# Patient Record
Sex: FEMALE | Race: WHITE | Employment: FULL TIME | URBAN - NONMETROPOLITAN AREA
[De-identification: names, ages, dates, MRNs, and addresses within clinical notes are randomized per-mention and may not be internally consistent; named-entity substitution may affect disease eponyms.]

---

## 2017-03-13 ENCOUNTER — OFFICE VISIT (OUTPATIENT)
Dept: PHYSICAL MEDICINE AND REHAB | Age: 37
End: 2017-03-13

## 2017-03-13 VITALS
SYSTOLIC BLOOD PRESSURE: 132 MMHG | BODY MASS INDEX: 27.46 KG/M2 | DIASTOLIC BLOOD PRESSURE: 90 MMHG | HEART RATE: 76 BPM | HEIGHT: 63 IN | WEIGHT: 155 LBS

## 2017-03-13 DIAGNOSIS — I95.1 SYNCOPE DUE TO ORTHOSTATIC HYPOTENSION: Primary | ICD-10-CM

## 2017-03-13 DIAGNOSIS — R55 SYNCOPE, UNSPECIFIED SYNCOPE TYPE: ICD-10-CM

## 2017-03-13 PROCEDURE — 99213 OFFICE O/P EST LOW 20 MIN: CPT | Performed by: PSYCHIATRY & NEUROLOGY

## 2017-03-13 RX ORDER — LAMOTRIGINE 150 MG/1
TABLET ORAL
Qty: 60 TABLET | Refills: 11 | Status: SHIPPED | OUTPATIENT
Start: 2017-03-13 | End: 2018-03-19 | Stop reason: SDUPTHER

## 2017-03-13 RX ORDER — FOLIC ACID 1 MG/1
1 TABLET ORAL DAILY
Qty: 90 TABLET | Refills: 3 | Status: SHIPPED | OUTPATIENT
Start: 2017-03-13 | End: 2018-03-19 | Stop reason: SDUPTHER

## 2018-01-10 ENCOUNTER — HOSPITAL ENCOUNTER (OUTPATIENT)
Age: 38
Discharge: HOME | End: 2018-01-10
Payer: COMMERCIAL

## 2018-01-10 LAB
ALBUMIN SERPL-MCNC: 4.6 G/DL (ref 3.5–5.1)
ALP BLD-CCNC: 46 U/L (ref 38–126)
ALT SERPL-CCNC: 19 U/L (ref 11–66)
ANION GAP SERPL CALCULATED.3IONS-SCNC: 13 MEQ/L (ref 8–16)
AST SERPL-CCNC: 14 U/L (ref 5–40)
BILIRUB SERPL-MCNC: 0.3 MG/DL (ref 0.3–1.2)
BUN BLDV-MCNC: 13 MG/DL (ref 7–22)
CALCIUM SERPL-MCNC: 9.2 MG/DL (ref 8.5–10.5)
CHLORIDE BLD-SCNC: 103 MEQ/L (ref 98–111)
CO2: 26 MEQ/L (ref 23–33)
CREAT SERPL-MCNC: 1 MG/DL (ref 0.4–1.2)
GFR SERPL CREATININE-BSD FRML MDRD: 62 ML/MIN/1.73M2
GLUCOSE BLD-MCNC: 99 MG/DL (ref 70–108)
HCT VFR BLD CALC: 40.5 % (ref 37–47)
HEMOGLOBIN: 13.6 GM/DL (ref 12–16)
MCH RBC QN AUTO: 29.6 PG (ref 27–31)
MCHC RBC AUTO-ENTMCNC: 33.6 GM/DL (ref 33–37)
MCV RBC AUTO: 87.9 FL (ref 81–99)
PDW BLD-RTO: 13.1 % (ref 11.5–14.5)
PLATELET # BLD: 224 THOU/MM3 (ref 130–400)
PMV BLD AUTO: 10 MCM (ref 7.4–10.4)
POTASSIUM SERPL-SCNC: 5 MEQ/L (ref 3.5–5.2)
RBC # BLD: 4.61 MILL/MM3 (ref 4.2–5.4)
SODIUM BLD-SCNC: 142 MEQ/L (ref 135–145)
T4 FREE: 1.03 NG/DL (ref 0.93–1.76)
TOTAL PROTEIN: 6.7 G/DL (ref 6.1–8)
TSH SERPL DL<=0.05 MIU/L-ACNC: 3.22 UIU/ML (ref 0.4–4.2)
VITAMIN D 25-HYDROXY: 42 NG/ML (ref 30–100)
WBC # BLD: 6.3 THOU/MM3 (ref 4.8–10.8)

## 2018-01-10 PROCEDURE — 85027 COMPLETE CBC AUTOMATED: CPT

## 2018-01-10 PROCEDURE — 36415 COLL VENOUS BLD VENIPUNCTURE: CPT

## 2018-01-10 PROCEDURE — 84439 ASSAY OF FREE THYROXINE: CPT

## 2018-01-10 PROCEDURE — 82306 VITAMIN D 25 HYDROXY: CPT

## 2018-01-10 PROCEDURE — 80053 COMPREHEN METABOLIC PANEL: CPT

## 2018-01-10 PROCEDURE — 84443 ASSAY THYROID STIM HORMONE: CPT

## 2018-03-19 ENCOUNTER — OFFICE VISIT (OUTPATIENT)
Dept: NEUROLOGY | Age: 38
End: 2018-03-19
Payer: COMMERCIAL

## 2018-03-19 VITALS
HEIGHT: 64 IN | WEIGHT: 154.2 LBS | BODY MASS INDEX: 26.32 KG/M2 | HEART RATE: 90 BPM | SYSTOLIC BLOOD PRESSURE: 110 MMHG | DIASTOLIC BLOOD PRESSURE: 82 MMHG

## 2018-03-19 DIAGNOSIS — I95.1 ORTHOSTATIC HYPOTENSION: Primary | ICD-10-CM

## 2018-03-19 DIAGNOSIS — I95.1 SYNCOPE DUE TO ORTHOSTATIC HYPOTENSION: ICD-10-CM

## 2018-03-19 PROCEDURE — 99213 OFFICE O/P EST LOW 20 MIN: CPT | Performed by: PSYCHIATRY & NEUROLOGY

## 2018-03-19 RX ORDER — FOLIC ACID 1 MG/1
1 TABLET ORAL DAILY
Qty: 90 TABLET | Refills: 3 | Status: SHIPPED | OUTPATIENT
Start: 2018-03-19

## 2018-03-19 RX ORDER — LAMOTRIGINE 150 MG/1
TABLET ORAL
Qty: 60 TABLET | Refills: 11 | Status: SHIPPED | OUTPATIENT
Start: 2018-03-19 | End: 2019-03-18 | Stop reason: SDUPTHER

## 2019-03-18 ENCOUNTER — OFFICE VISIT (OUTPATIENT)
Dept: NEUROLOGY | Age: 39
End: 2019-03-18
Payer: COMMERCIAL

## 2019-03-18 VITALS
SYSTOLIC BLOOD PRESSURE: 112 MMHG | HEART RATE: 80 BPM | DIASTOLIC BLOOD PRESSURE: 70 MMHG | HEIGHT: 63 IN | WEIGHT: 151.2 LBS | BODY MASS INDEX: 26.79 KG/M2

## 2019-03-18 DIAGNOSIS — I95.1 SYNCOPE DUE TO ORTHOSTATIC HYPOTENSION: ICD-10-CM

## 2019-03-18 PROCEDURE — 99213 OFFICE O/P EST LOW 20 MIN: CPT | Performed by: NURSE PRACTITIONER

## 2019-03-18 RX ORDER — LAMOTRIGINE 150 MG/1
TABLET ORAL
Qty: 180 TABLET | Refills: 3 | Status: SHIPPED | OUTPATIENT
Start: 2019-03-18 | End: 2020-03-05

## 2019-03-19 ENCOUNTER — HOSPITAL ENCOUNTER (OUTPATIENT)
Age: 39
Discharge: HOME | End: 2019-03-19
Payer: COMMERCIAL

## 2019-03-19 DIAGNOSIS — I95.1 SYNCOPE DUE TO ORTHOSTATIC HYPOTENSION: ICD-10-CM

## 2019-03-19 LAB
ALBUMIN SERPL-MCNC: 4.1 G/DL (ref 3.5–5.1)
ALP BLD-CCNC: 41 U/L (ref 38–126)
ALT SERPL-CCNC: 24 U/L (ref 11–66)
AST SERPL-CCNC: 17 U/L (ref 5–40)
BASOPHILS # BLD: 0.8 %
BASOPHILS ABSOLUTE: 0 THOU/MM3 (ref 0–0.1)
BILIRUB SERPL-MCNC: 0.3 MG/DL (ref 0.3–1.2)
BILIRUBIN DIRECT: < 0.2 MG/DL (ref 0–0.3)
EOSINOPHIL # BLD: 5.4 %
EOSINOPHILS ABSOLUTE: 0.3 THOU/MM3 (ref 0–0.4)
ERYTHROCYTE [DISTWIDTH] IN BLOOD BY AUTOMATED COUNT: 12.7 % (ref 11.5–14.5)
ERYTHROCYTE [DISTWIDTH] IN BLOOD BY AUTOMATED COUNT: 42.1 FL (ref 35–45)
HCT VFR BLD CALC: 39.4 % (ref 37–47)
HEMOGLOBIN: 13 GM/DL (ref 12–16)
IMMATURE GRANS (ABS): 0.01 THOU/MM3 (ref 0–0.07)
IMMATURE GRANULOCYTES: 0.2 %
LYMPHOCYTES # BLD: 38.9 %
LYMPHOCYTES ABSOLUTE: 1.9 THOU/MM3 (ref 1–4.8)
MCH RBC QN AUTO: 29.9 PG (ref 26–33)
MCHC RBC AUTO-ENTMCNC: 33 GM/DL (ref 32.2–35.5)
MCV RBC AUTO: 90.6 FL (ref 81–99)
MONOCYTES # BLD: 5.4 %
MONOCYTES ABSOLUTE: 0.3 THOU/MM3 (ref 0.4–1.3)
NUCLEATED RED BLOOD CELLS: 0 /100 WBC
PLATELET # BLD: 212 THOU/MM3 (ref 130–400)
PMV BLD AUTO: 11 FL (ref 9.4–12.4)
RBC # BLD: 4.35 MILL/MM3 (ref 4.2–5.4)
SEG NEUTROPHILS: 49.3 %
SEGMENTED NEUTROPHILS ABSOLUTE COUNT: 2.5 THOU/MM3 (ref 1.8–7.7)
TOTAL PROTEIN: 6.7 G/DL (ref 6.1–8)
WBC # BLD: 5 THOU/MM3 (ref 4.8–10.8)

## 2019-03-19 PROCEDURE — 36415 COLL VENOUS BLD VENIPUNCTURE: CPT

## 2019-03-19 PROCEDURE — 85025 COMPLETE CBC W/AUTO DIFF WBC: CPT

## 2019-03-19 PROCEDURE — 80076 HEPATIC FUNCTION PANEL: CPT

## 2020-03-05 RX ORDER — LAMOTRIGINE 150 MG/1
TABLET ORAL
Qty: 180 TABLET | Refills: 3 | Status: SHIPPED | OUTPATIENT
Start: 2020-03-05 | End: 2020-04-02

## 2020-03-23 ENCOUNTER — HOSPITAL ENCOUNTER (OUTPATIENT)
Age: 40
Discharge: HOME | End: 2020-03-23
Payer: COMMERCIAL

## 2020-03-23 ENCOUNTER — TELEPHONE (OUTPATIENT)
Dept: NEUROLOGY | Age: 40
End: 2020-03-23

## 2020-03-23 DIAGNOSIS — I95.1 SYNCOPE DUE TO ORTHOSTATIC HYPOTENSION: ICD-10-CM

## 2020-03-23 PROCEDURE — 36415 COLL VENOUS BLD VENIPUNCTURE: CPT

## 2020-03-23 PROCEDURE — 80175 DRUG SCREEN QUAN LAMOTRIGINE: CPT

## 2020-03-23 NOTE — TELEPHONE ENCOUNTER
Patient called stating she had episode of shaking witnessed by coworker on 3/20/20. She bit her tongue and had urine incontinence during episode. She denies missing any doses of Lamictal 150 2 times a day. Spoke with Dr Shauna Oviedo who stated obtain Lamictal level. Consider increasing Lamictal to 200 mg 2 times a day based on results. No driving, swimming, operating heavy machinery, or compromising heights until event free for 6 months.  Patient notified and verbalized understanding

## 2020-03-25 LAB — LAMOTRIGINE LEVEL: 4.4 UG/ML (ref 2.5–15)

## 2020-04-02 ENCOUNTER — VIRTUAL VISIT (OUTPATIENT)
Dept: NEUROLOGY | Age: 40
End: 2020-04-02
Payer: COMMERCIAL

## 2020-04-02 PROCEDURE — 99213 OFFICE O/P EST LOW 20 MIN: CPT | Performed by: NURSE PRACTITIONER

## 2020-04-02 RX ORDER — LAMOTRIGINE 200 MG/1
TABLET ORAL
Qty: 60 TABLET | Refills: 3 | Status: SHIPPED | OUTPATIENT
Start: 2020-04-02 | End: 2020-06-10

## 2020-04-02 ASSESSMENT — ENCOUNTER SYMPTOMS
EYES NEGATIVE: 1
RESPIRATORY NEGATIVE: 1
GASTROINTESTINAL NEGATIVE: 1

## 2020-04-02 NOTE — PROGRESS NOTES
oximetry-     Constitutional: [x] Appears well-developed and well-nourished [x] No apparent distress      [] Abnormal-   Mental status  [x] Alert and awake  [x] Oriented to person/place/time [x]Able to follow commands      Eyes:  EOM    [x]  Normal  [] Abnormal-  Sclera  [x]  Normal  [] Abnormal -         Discharge [x]  None visible  [] Abnormal -    HENT:   [x] Normocephalic, atraumatic. [] Abnormal   [x] Mouth/Throat: Mucous membranes are moist.     External Ears [x] Normal  [] Abnormal-     Neck: [x] No visualized mass     Pulmonary/Chest: [x] Respiratory effort normal.  [x] No visualized signs of difficulty breathing or respiratory distress        [] Abnormal-      Musculoskeletal:   [] Normal gait with no signs of ataxia         [x] Normal range of motion of neck        [] Abnormal-       Neurological:        [x] No Facial Asymmetry (Cranial nerve 7 motor function) (limited exam to video visit)          [x] No gaze palsy        [] Abnormal-         Skin:        [x] No significant exanthematous lesions or discoloration noted on facial skin         [] Abnormal-            Psychiatric:       [x] Normal Affect [x] No Hallucinations        [] Abnormal-     Other pertinent observable physical exam findings-     ASSESSMENT/PLAN:  1. Seizure like activity    She called in on 3-20-20 stating that she had episode of LOC with shaking witnessed by coworker on 3/20/20. Her coworkers told her that she stood up quickly and starred off and then passed out. The last thing she remembers is sitting and talking with her coworkers. She denies any warning or feeling of dizziness prior to passing out. She bit her tongue and had urine incontinence during episode. This lasted 3.5 minutes. She denies missing any doses of Lamictal 150 mg 2 times a day. She has sleep apnea, but she is not wearing her CPAP. She had her Lamictal level drawn on 3/23/2020 =4.4. We will increase her Lamictal to 200 mg twice a day.   We will arrange for her

## 2020-05-18 ENCOUNTER — HOSPITAL ENCOUNTER (OUTPATIENT)
Dept: NON INVASIVE DIAGNOSTICS | Age: 40
Discharge: HOME | End: 2020-05-18
Payer: COMMERCIAL

## 2020-05-18 ENCOUNTER — HOSPITAL ENCOUNTER (OUTPATIENT)
Dept: NEUROLOGY | Age: 40
Discharge: HOME | End: 2020-05-18
Payer: COMMERCIAL

## 2020-05-18 ENCOUNTER — HOSPITAL ENCOUNTER (OUTPATIENT)
Age: 40
Discharge: HOME | End: 2020-05-18
Payer: COMMERCIAL

## 2020-05-18 DIAGNOSIS — R56.9 SEIZURE-LIKE ACTIVITY (HCC): ICD-10-CM

## 2020-05-18 LAB
ALBUMIN SERPL-MCNC: 4.5 G/DL (ref 3.5–5.1)
ALP BLD-CCNC: 47 U/L (ref 38–126)
ALT SERPL-CCNC: 16 U/L (ref 11–66)
AST SERPL-CCNC: 15 U/L (ref 5–40)
BASOPHILS # BLD: 0.8 %
BASOPHILS ABSOLUTE: 0.1 THOU/MM3 (ref 0–0.1)
BILIRUB SERPL-MCNC: 0.2 MG/DL (ref 0.3–1.2)
BILIRUBIN DIRECT: < 0.2 MG/DL (ref 0–0.3)
EOSINOPHIL # BLD: 4.8 %
EOSINOPHILS ABSOLUTE: 0.3 THOU/MM3 (ref 0–0.4)
ERYTHROCYTE [DISTWIDTH] IN BLOOD BY AUTOMATED COUNT: 13.2 % (ref 11.5–14.5)
ERYTHROCYTE [DISTWIDTH] IN BLOOD BY AUTOMATED COUNT: 44.7 FL (ref 35–45)
HCT VFR BLD CALC: 41.6 % (ref 37–47)
HEMOGLOBIN: 13.3 GM/DL (ref 12–16)
IMMATURE GRANS (ABS): 0.02 THOU/MM3 (ref 0–0.07)
IMMATURE GRANULOCYTES: 0.3 %
LYMPHOCYTES # BLD: 34.6 %
LYMPHOCYTES ABSOLUTE: 2.2 THOU/MM3 (ref 1–4.8)
MCH RBC QN AUTO: 29.6 PG (ref 26–33)
MCHC RBC AUTO-ENTMCNC: 32 GM/DL (ref 32.2–35.5)
MCV RBC AUTO: 92.7 FL (ref 81–99)
MONOCYTES # BLD: 5.1 %
MONOCYTES ABSOLUTE: 0.3 THOU/MM3 (ref 0.4–1.3)
NUCLEATED RED BLOOD CELLS: 0 /100 WBC
PLATELET # BLD: 238 THOU/MM3 (ref 130–400)
PMV BLD AUTO: 11.4 FL (ref 9.4–12.4)
RBC # BLD: 4.49 MILL/MM3 (ref 4.2–5.4)
SEG NEUTROPHILS: 54.4 %
SEGMENTED NEUTROPHILS ABSOLUTE COUNT: 3.5 THOU/MM3 (ref 1.8–7.7)
TOTAL PROTEIN: 7.2 G/DL (ref 6.1–8)
WBC # BLD: 6.5 THOU/MM3 (ref 4.8–10.8)

## 2020-05-18 PROCEDURE — 85025 COMPLETE CBC W/AUTO DIFF WBC: CPT

## 2020-05-18 PROCEDURE — 93270 REMOTE 30 DAY ECG REV/REPORT: CPT

## 2020-05-18 PROCEDURE — 95816 EEG AWAKE AND DROWSY: CPT

## 2020-05-18 PROCEDURE — 36415 COLL VENOUS BLD VENIPUNCTURE: CPT

## 2020-05-18 PROCEDURE — 95816 EEG AWAKE AND DROWSY: CPT | Performed by: PSYCHIATRY & NEUROLOGY

## 2020-05-18 PROCEDURE — 80175 DRUG SCREEN QUAN LAMOTRIGINE: CPT

## 2020-05-18 PROCEDURE — 80076 HEPATIC FUNCTION PANEL: CPT

## 2020-05-19 NOTE — PROCEDURES
05/19/2020 6:01:23       T: 05/19/2020 6:06:26     [email or web address]  Job#: [id number]     Doc#: [id number]    CC:

## 2020-05-21 LAB — LAMOTRIGINE LEVEL: 5 UG/ML (ref 2.5–15)

## 2020-06-07 NOTE — PROGRESS NOTES
morning:No.  History of dry mouth in the morning: No.  History of palpitations during night time/nocturnal awakenings: No.  History of sweating during night time/nocturnal awakenings: Yes- occasionally    General:  History of head injury in the past: Yes. [x] Not due to MVA- in 2012 she fell down on the floor and hit her head during a seizure episode. History of seizures: Yes. She is on Lamictal and follows with Dr. Maurilio Calvin MD  Rest less legs syndrome symptoms:NO  History suggestive of periodic limb movements during sleep: NO  History suggestive of hypnagogic hallucinations: NO  History suggestive of hypnopompic hallucinations: NO  History suggestive of sleep talking: NO  History suggestive of sleep walking:NO  History suggestive of bruxism: NO     History suggestive of cataplexy: NO  History suggestive of sleep paralysis:NO    Family history of sleep disorders:  Family history of obstructive sleep apnea: NO.  Family history of Narcolepsy: NO.  Family history of Rest less legs syndrome : NO.      History regarding old sleep studies:  Prior history of sleep study: Yes  Please see the diagnostic data section for details. Using CPAP device: No.  Currently using home Oxygen: NO.       Patient considerations:  Is the patient is ambulatory: Yes  Patient is currently using: None of these Wheelchair, Dene Yaw or Vanna [place]wood. Para/Quadriplegic: NO  Hearing deficit : NO  Claustrophobic: NO  MDD : NO  Blind: NO  Incontinent: NO  Para/Quadraplegi: NO.   Need transportation to and from Sleep Center:NO    Social History:  Social History     Tobacco Use    Smoking status: Never Smoker    Smokeless tobacco: Never Used   Substance Use Topics    Alcohol use: Yes     Alcohol/week: 0.0 standard drinks     Comment: occassionally    Drug use: No   .  She is currently working: Yes. 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   She usually goes to her work at:  8:00AM.   She completes her work vitals reviewed. Constitutional: Patient appears moderately built and moderately nourished. No distress. Patient is oriented to person, place, and time. HENT:   Head: Normocephalic and atraumatic. Right Ear: External ear normal.   Left Ear: External ear normal.   Mouth/Throat: Oropharynx is clear and moist.  No oral thrush. Eyes: Conjunctivae are normal. Pupils are equal, round, and reactive to light. No scleral icterus. Neck: Neck supple. No JVD present. No tracheal deviation present. Cardiovascular: Normal rate, regular rhythm, normal heart sounds. No murmur heard. Pulmonary/Chest: Effort normal and breath sounds normal. No stridor. No respiratory distress. No wheezes. No rales. Patient exhibits no tenderness. Abdominal: Soft. Patient exhibits no distension. No tenderness. Musculoskeletal: Normal range of motion. Extremities: Patient exhibits no edema and no tenderness. Lymphadenopathy:  No cervical adenopathy. Neurological: Patient is alert and oriented to person, place, and time. Skin: Skin is warm and dry. Patient is not diaphoretic. Psychiatric: Patient  has a normal mood and affect. Patient behavior is normal.     Diagnostic Data:      Sleep test: 2014  PATIENT NAME: Franklin Christie       :  1980  MEDICAL RECORD NO. [id number]               ROOM:   ACCOUNT NO: [de-identified]                        DATE: 2014  PHYSICIAN: JACKI Gallardo Ahr:  Dr. Marivel Mensah.     RESPIRATORY EVENT ANALYSIS:  Revealed the patient had a total of 4 apneas and  52 hypopneas. Out of 4 apneas, all of them are found to be obstructive in  nature. The total number of apneas and hypopneas recorded during study were  56 with an apnea-hypopnea index of 13.5. The patient had a total of 87  respiratory events with a respiratory disturbance index of 33.2. The  patient's REM sleep apnea-hypopnea was 0. IMPRESSION:    1.     Mild

## 2020-06-08 ENCOUNTER — INITIAL CONSULT (OUTPATIENT)
Dept: PULMONOLOGY | Age: 40
End: 2020-06-08
Payer: COMMERCIAL

## 2020-06-08 VITALS
SYSTOLIC BLOOD PRESSURE: 120 MMHG | WEIGHT: 153 LBS | DIASTOLIC BLOOD PRESSURE: 76 MMHG | BODY MASS INDEX: 26.12 KG/M2 | TEMPERATURE: 98.8 F | OXYGEN SATURATION: 100 % | HEART RATE: 98 BPM | HEIGHT: 64 IN

## 2020-06-08 PROCEDURE — 99204 OFFICE O/P NEW MOD 45 MIN: CPT | Performed by: INTERNAL MEDICINE

## 2020-06-10 ENCOUNTER — HOSPITAL ENCOUNTER (OUTPATIENT)
Age: 40
Discharge: HOME | End: 2020-06-10
Payer: COMMERCIAL

## 2020-06-10 ENCOUNTER — TELEPHONE (OUTPATIENT)
Dept: NEUROLOGY | Age: 40
End: 2020-06-10

## 2020-06-10 DIAGNOSIS — I95.1 SYNCOPE DUE TO ORTHOSTATIC HYPOTENSION: ICD-10-CM

## 2020-06-10 DIAGNOSIS — R56.9 SEIZURE-LIKE ACTIVITY (HCC): ICD-10-CM

## 2020-06-10 PROCEDURE — 84206 ASSAY OF PROINSULIN: CPT

## 2020-06-10 PROCEDURE — 36415 COLL VENOUS BLD VENIPUNCTURE: CPT

## 2020-06-10 PROCEDURE — 83036 HEMOGLOBIN GLYCOSYLATED A1C: CPT

## 2020-06-10 RX ORDER — LAMOTRIGINE 200 MG/1
TABLET ORAL
Qty: 60 TABLET | Refills: 3 | Status: SHIPPED | OUTPATIENT
Start: 2020-06-10 | End: 2020-06-17

## 2020-06-11 LAB
AVERAGE GLUCOSE: 84 MG/DL (ref 70–126)
HBA1C MFR BLD: 4.8 % (ref 4.4–6.4)

## 2020-06-15 LAB — PROINSULIN: NORMAL

## 2020-06-17 ENCOUNTER — OFFICE VISIT (OUTPATIENT)
Dept: NEUROLOGY | Age: 40
End: 2020-06-17
Payer: COMMERCIAL

## 2020-06-17 VITALS
HEIGHT: 64 IN | HEART RATE: 106 BPM | SYSTOLIC BLOOD PRESSURE: 122 MMHG | WEIGHT: 158 LBS | BODY MASS INDEX: 26.98 KG/M2 | DIASTOLIC BLOOD PRESSURE: 80 MMHG

## 2020-06-17 PROCEDURE — 99213 OFFICE O/P EST LOW 20 MIN: CPT | Performed by: PSYCHIATRY & NEUROLOGY

## 2020-06-17 RX ORDER — LAMOTRIGINE 100 MG/1
300 TABLET ORAL 2 TIMES DAILY
Qty: 180 TABLET | Refills: 3 | Status: SHIPPED | OUTPATIENT
Start: 2020-06-17 | End: 2020-10-05 | Stop reason: SDUPTHER

## 2020-06-17 RX ORDER — LAMOTRIGINE 100 MG/1
300 TABLET ORAL DAILY
Qty: 180 TABLET | Refills: 3 | Status: SHIPPED | OUTPATIENT
Start: 2020-06-17 | End: 2020-06-17 | Stop reason: SDUPTHER

## 2020-06-17 NOTE — PROGRESS NOTES
NEUROLOGY OUT PATIENT FOLLOW UP NOTE:  6/17/202011:04 AM    Esthela Albert is here for follow up for   Patient Active Problem List   Diagnosis    Syncope    Orthostatic hypotension    Daytime somnolence    Head injury    Obstructive sleep apnea on CPAP    Syncope due to orthostatic hypotension         Follow-up for migraine headache,   Seizure-like activity, abnormal EEG. The patient denies any seizure, she reports increased headaches, and feeling foggy at times. She is taking her Lamictal consistently at 200 mg in the morning, 300 mg at nighttime no side effects reported to the medication. She is also taking folic acid on daily basis with the medication. The patient is being evaluated to discuss medication and plan of care going forward. ROS:  Respiratory : no cough, no shortness of breath  Cardiac: no chest pain. No palpitations. Renal : no flank pain, no hematuria    Skin: no rash      No Known Allergies    Current Outpatient Medications:     Fexofenadine HCl (ALLEGRA ALLERGY PO), Take by mouth daily as needed, Disp: , Rfl:     lamoTRIgine (LAMICTAL) 200 MG tablet, Take 1 tablet by mouth in the morning and one and a half tablets at night, Disp: 60 tablet, Rfl: 3    folic acid (FOLVITE) 1 MG tablet, Take 1 tablet by mouth daily, Disp: 90 tablet, Rfl: 3    metoprolol (TOPROL-XL) 50 MG XL tablet, Take 50 mg by mouth daily. , Disp: , Rfl:     Norethin Ace-Eth Estrad-FE (LOESTRIN 24 FE PO), Take 1 tablet by mouth daily. , Disp: , Rfl:     Norethin Ace-Eth Estrad-FE (MIBELAS 24 FE PO), Take by mouth, Disp: , Rfl:       PE:   Vitals:    06/17/20 1055   BP: 122/80   Site: Right Upper Arm   Position: Sitting   Cuff Size: Small Adult   Pulse: 106   Weight: 158 lb (71.7 kg)   Height: 5' 4\" (1.626 m)        General Appearance:  alert and cooperative  Skin:  Skin color, texture, turgor normal. No rashes or lesions. Gen: NAD, Language is Intact.  Skin: no rash, lesion,  moist to touch. warm  Head: no rash, no icterus  Neck: There is no carotid bruits. The Neck is supple. There is no neck lymphadenopathy. Neuro: CN 2-12 grossly intact with no focal deficits. Power 5/5 Throughout symmetric, Reflexes are +2 symmetric. Long tracts are intact. Cerebellar exam is Intact. Sensory exam is intact to light touch. Gait is intact. Musculoskeletal:  Has no hand arthritis, no limitation of ROM in any of the four extremities. Lower extremities no edema  The abdomen is soft,  intact bowel sounds. DATA:  Results for orders placed or performed during the hospital encounter of 06/10/20   Hemoglobin A1C   Result Value Ref Range    Hemoglobin A1C 4.8 4.4 - 6.4 %    AVERAGE GLUCOSE 84 70 - 126 mg/dL   Proinsulin   Result Value Ref Range    Proinsulin SEE BELOW             Assessment:     Diagnosis Orders   1. Seizure-like activity ([address]     2. Syncope due to orthostatic hypotension        She has increased headache, fog in the day. she is not using the CPAP. She feels foggy with her thinking at times. She is taking her Lamictal. Was interested in checking the Insulin level that was normal. EEG was abnormal we discussed increasing the lamictal dosage as she is tolerating the medication and reports benefit to it. We will repeat lamictal level, she should wear her CPAP and measure BP when symptomatic. After detailed discussion with patient we agreed on the following plan. Plan:  1. Change Lamictal to 300 mg twice a day. 2. Repeat Lamictal level in one week. 3. Measure blood pressure when symptomatic. 4. Please use you CPAP. 5. Follow up in one to two months or sooner if needed. 6. Report any new symptoms. 7. Call if any questions or concerns. Please call if any questions.      Alfreda Calero MD

## 2020-06-17 NOTE — PATIENT INSTRUCTIONS
1.  Change Lamictal to 300 mg twice a day. 2. Repeat Lamictal level in one week. 3. Measure blood pressure when symptomatic. 4. Please use you CPAP. 5. Follow up in one to two months or sooner if needed. 6. Report any new symptoms. 7. Call if any questions or concerns.

## 2020-06-21 NOTE — PROCEDURES
[address] [place] [address]                                 EVENT MONITOR    PATIENT NAME: Concha Rhodes               :        1980  MED REC NO:   [id number]                           ROOM:  ACCOUNT NO:   [de-identified]                           ADMIT DATE: 2020  PROVIDER:     Brittani Mayes M.D. INDICATION:  Unspecified convulsion. DATE OF ENROLMENT:  2020 to 2020. FINDINGS:  The patient is in normal sinus rhythm. No evidence of atrial  fibrillation. There is no significant tachy or bradyarrhythmia. No  ventricular or supraventricular tachycardia and the heart rates, all are  sinus and they range from a minimum of 62 beats per minute to a maximum  of 148 beats per minute. No evidence of atrial fibrillation and there  are multiple diaries that have been entered, and the patient complained  of fatigue, dizziness, chest pain, pressure. All those multiple  complaints at different times; however, at all those times, the patient  was in perfectly normal sinus rhythm with no katie or tachyarrhythmia  and no ventricular or supraventricular ectopic beats. CONCLUSION:  This is a benign event monitor finding with normal sinus  rhythm. Heart rate ranging from 62 to 148 beats per minute from the  recorded. No ventricular or supraventricular ectopic beats. No  ventricular or supraventricular tachycardia. No significant  bradyarrhythmia. The symptom of the patient cannot be explained. The  symptom of the patient correlated with normal sinus rhythm with normal  heart rate. Megan Russo.  Keegan Castano M.D.    D: 2020 15:35:27       T: 2020 16:54:06     DALE/[email or web address]_GERARD  Job#: [id number]     Doc#: [id number]    CC:

## 2020-06-22 ENCOUNTER — TELEPHONE (OUTPATIENT)
Dept: NEUROLOGY | Age: 40
End: 2020-06-22

## 2020-06-26 ENCOUNTER — TELEPHONE (OUTPATIENT)
Dept: NEUROLOGY | Age: 40
End: 2020-06-26

## 2020-06-26 ENCOUNTER — PATIENT MESSAGE (OUTPATIENT)
Dept: NEUROLOGY | Age: 40
End: 2020-06-26

## 2020-06-26 NOTE — TELEPHONE ENCOUNTER
Can she see a cardiologist re these BP fluctuations, they can indeed if are happening cause the symptoms she is experiencing.    Alejo Cox MD

## 2020-06-29 ENCOUNTER — HOSPITAL ENCOUNTER (OUTPATIENT)
Age: 40
Discharge: HOME | End: 2020-06-29
Payer: COMMERCIAL

## 2020-06-29 DIAGNOSIS — R56.9 SEIZURE-LIKE ACTIVITY (HCC): ICD-10-CM

## 2020-06-29 DIAGNOSIS — I95.1 SYNCOPE DUE TO ORTHOSTATIC HYPOTENSION: ICD-10-CM

## 2020-06-29 PROCEDURE — 36415 COLL VENOUS BLD VENIPUNCTURE: CPT

## 2020-06-29 PROCEDURE — 80175 DRUG SCREEN QUAN LAMOTRIGINE: CPT

## 2020-07-01 ENCOUNTER — OFFICE VISIT (OUTPATIENT)
Dept: CARDIOLOGY CLINIC | Age: 40
End: 2020-07-01
Payer: COMMERCIAL

## 2020-07-01 VITALS
HEART RATE: 96 BPM | BODY MASS INDEX: 26.12 KG/M2 | WEIGHT: 153 LBS | DIASTOLIC BLOOD PRESSURE: 80 MMHG | HEIGHT: 64 IN | SYSTOLIC BLOOD PRESSURE: 142 MMHG

## 2020-07-01 LAB — LAMOTRIGINE LEVEL: 11.5 UG/ML (ref 2.5–15)

## 2020-07-01 PROCEDURE — 99204 OFFICE O/P NEW MOD 45 MIN: CPT | Performed by: NUCLEAR MEDICINE

## 2020-07-01 ASSESSMENT — ENCOUNTER SYMPTOMS
ABDOMINAL PAIN: 0
DIARRHEA: 0
BACK PAIN: 0
NAUSEA: 0
ANAL BLEEDING: 0
SHORTNESS OF BREATH: 0
BLOOD IN STOOL: 0
ABDOMINAL DISTENTION: 0
PHOTOPHOBIA: 0
CHEST TIGHTNESS: 0
RECTAL PAIN: 0
VOMITING: 0
CONSTIPATION: 0

## 2020-07-01 NOTE — PROGRESS NOTES
[address] [facility],[address] [address]  Dept: [phone]  Dept Fax: [phone]  Loc: [phone]    Visit Date: 7/1/2020    Shane Rasmussen is a 35 y.o. female who presents todayfor:  Chief Complaint   Patient presents with    Check-Up    Hypertension    Dizziness   here for the first time   Started 2012   Had a seizure then and started on medical RX for seizure then   Did well   Then lately had some more dizziness  \" brain fog\"  Then lately in march started having more seizures   Had another EEG and work up   Did have some more issues with high BP   In fact more fluctating BP  She has different symptoms  Blurry vision   Fog brain   Fatigue and nausea   Different multiple symptoms  Does have HTN  No chest pain  No changes in breathing  No known hyperlipidemia  No smoking   Family history of CAD       HPI:  HPI  Past Medical History:   Diagnosis Date    Orthostatic hypotension     Syncope     Unspecified sleep apnea       Past Surgical History:   Procedure Laterality Date    CHOLECYSTECTOMY       Family History   Problem Relation Age of Onset    High Blood Pressure Brother     High Blood Pressure Brother      Social History     Tobacco Use    Smoking status: Never Smoker    Smokeless tobacco: Never Used   Substance Use Topics    Alcohol use: Yes     Alcohol/week: 0.0 standard drinks     Comment: occassionally      Current Outpatient Medications   Medication Sig Dispense Refill    Fexofenadine HCl (ALLEGRA ALLERGY PO) Take by mouth daily as needed      lamoTRIgine (LAMICTAL) 100 MG tablet Take 3 tablets by mouth 2 times daily 180 tablet 3    Norethin Ace-Eth Estrad-FE (MIBELAS 24 FE PO) Take by mouth      folic acid (FOLVITE) 1 MG tablet Take 1 tablet by mouth daily 90 tablet 3    metoprolol (TOPROL-XL) 50 MG XL tablet Take 50 mg by mouth daily. No current facility-administered medications for this visit. No Known Allergies  Health Maintenance   Topic Date Due    HIV screen  07/25/1994    DTaP/Tdap/Td vaccine (1 - Tdap) 07/25/1998    Cervical cancer screen  07/25/2000    Lipid screen  07/25/2019    Flu vaccine (1) 09/01/2020    Hepatitis A vaccine  Aged Out    Hepatitis B vaccine  Aged Out    Hib vaccine  Aged Out    Meningococcal (ACWY) vaccine  Aged Out    Pneumococcal 0-64 years Vaccine  Aged Out       Subjective:  Review of Systems   Constitutional: Positive for fatigue. HENT: Negative for ear pain and nosebleeds. Eyes: Negative for photophobia. Respiratory: Negative for chest tightness and shortness of breath. Cardiovascular: Negative for chest pain and palpitations. Gastrointestinal: Negative for abdominal distention, abdominal pain, anal bleeding, blood in stool, constipation, diarrhea, nausea, rectal pain and vomiting. Endocrine: Negative for polyphagia. Genitourinary: Negative for dysuria and hematuria. Musculoskeletal: Negative for arthralgias, back pain, gait problem, joint swelling, myalgias, neck pain and neck stiffness. Neurological: Negative for dizziness, syncope and light-headedness. Psychiatric/Behavioral: Negative for behavioral problems, confusion, decreased concentration, dysphoric mood, hallucinations and self-injury. The patient is not nervous/anxious and is not hyperactive. Objective:  Physical Exam  HENT:      Head: Normocephalic. Nose: Nose normal.   Eyes:      Pupils: Pupils are equal, round, and reactive to light. Neck:      Musculoskeletal: Normal range of motion. Cardiovascular:      Rate and Rhythm: Normal rate and regular rhythm. Heart sounds: No murmur. No friction rub. No gallop. Pulmonary:      Effort: No respiratory distress. Breath sounds: No stridor. No wheezing, rhonchi or rales. Chest:      Chest wall: No tenderness. Abdominal:      General: There is no distension. Palpations: There is no mass. Tenderness: There is no abdominal tenderness. There is no right CVA tenderness or guarding. Hernia: No hernia is present. Musculoskeletal:         General: No swelling or deformity. Right lower leg: No edema. Skin:     Coloration: Skin is not jaundiced or pale. Findings: No bruising, erythema, lesion or rash. Neurological:      Mental Status: She is oriented to person, place, and time. Cranial Nerves: No cranial nerve deficit. Sensory: No sensory deficit. Motor: No weakness. Coordination: Coordination normal.      Gait: Gait normal.      Deep Tendon Reflexes: Reflexes normal.   Psychiatric:         Mood and Affect: Mood normal.         Thought Content: Thought content normal.         Judgment: Judgment normal.       BP (!) 142/80   Pulse 96   Ht 5' 4\" (1.626 m)   Wt 153 lb (69.4 kg)   BMI 26.26 kg/m²     Assessment:      Diagnosis Orders   1. Essential hypertension     2. Syncope and collapse     possible neurocardiogenic issues  Autonomic dysfunction ? ? Plan:  No follow-ups on file. Echo   Tilt   Discussed at length and in full details  Refer to endocrine after that   Continue risk factor modification and medical management  Thank you for allowing me to participate in the care of your patient. Please don't hesitate to contact me regarding any further issues related to the patient care    Orders Placed:  No orders of the defined types were placed in this encounter. Medications Prescribed:  No orders of the defined types were placed in this encounter. Discussed use, benefit, and side effects of prescribed medications. All patient questions answered. Pt voicedunderstanding. Instructed to continue current medications, diet and exercise. Continue risk factor modification and medical management. Patient agreed with treatment plan. Follow up as directed.     Electronically signedby Dwayne Galvez MD on 7/1/2020 at 7:59 AM

## 2020-07-23 ENCOUNTER — HOSPITAL ENCOUNTER (OUTPATIENT)
Dept: NON INVASIVE DIAGNOSTICS | Age: 40
Discharge: HOME | End: 2020-07-23
Payer: COMMERCIAL

## 2020-07-23 LAB
LV EF: 60 %
LVEF MODALITY: NORMAL

## 2020-07-23 PROCEDURE — 93306 TTE W/DOPPLER COMPLETE: CPT

## 2020-07-23 PROCEDURE — 93660 TILT TABLE EVALUATION: CPT

## 2020-07-24 NOTE — PROCEDURES
[address] [place] [address]                                TILT TABLE TEST    PATIENT NAME: Nakia Arauz               :        1980  MED REC NO:   [id number]                           ROOM:  ACCOUNT NO:   [de-identified]                           ADMIT DATE: 2020  PROVIDER:     JACKI Luevano STUDY:  2020    CLINICAL HISTORY AND INDICATION:  This is a patient with dizziness. TILT TABLE TEST DESCRIPTION AND FINDINGS:  Baseline EKG showed sinus  rhythm. Baseline blood pressure was 137/91. Baseline heart rate was 90  beats per minute. The patient was tilted for a total of 30 minutes at  70-degree angle. Tilt was associated with no significant blood pressure  changes. Blood pressure was ranging between 140 to 145. Heart rate was  ranging between 100 to 110. No other hemodynamic changes and no  significant symptoms. CONCLUSION:  1. Tilt table test associated with no significant hemodynamic changes. 2.  The patient had relatively steady tachycardiac through the test.  3.  Relatively high blood pressure. 4.  No complications. RECOMMENDATIONS:  At this time, consideration of low dose beta blocker  is recommended. Follow up accordingly.         Evie Powers M.D.    D: 2020 14:58:03       T: 2020 16:12:44     [email or web address]  Job#: [id number]     Doc#: [id number]    CC:

## 2020-07-24 NOTE — TELEPHONE ENCOUNTER
TTT results  On Toprol 50 mg daily  F/u is 10/7/20      CONCLUSION:  1. Tilt table test associated with no significant hemodynamic changes. 2.  The patient had relatively steady tachycardiac through the test.  3.  Relatively high blood pressure. 4.  No complications.     RECOMMENDATIONS:  At this time, consideration of low dose beta blocker  is recommended. Follow up accordingly.

## 2020-07-27 RX ORDER — METOPROLOL SUCCINATE 50 MG/1
TABLET, EXTENDED RELEASE ORAL
Qty: 45 TABLET | Refills: 11 | Status: SHIPPED | OUTPATIENT
Start: 2020-07-27 | End: 2021-07-27

## 2020-07-30 ENCOUNTER — OFFICE VISIT (OUTPATIENT)
Dept: CARDIOLOGY CLINIC | Age: 40
End: 2020-07-30
Payer: COMMERCIAL

## 2020-07-30 VITALS
SYSTOLIC BLOOD PRESSURE: 118 MMHG | HEART RATE: 84 BPM | WEIGHT: 154.2 LBS | DIASTOLIC BLOOD PRESSURE: 70 MMHG | HEIGHT: 64 IN | BODY MASS INDEX: 26.32 KG/M2

## 2020-07-30 PROCEDURE — 99214 OFFICE O/P EST MOD 30 MIN: CPT | Performed by: NUCLEAR MEDICINE

## 2020-07-30 PROCEDURE — 93000 ELECTROCARDIOGRAM COMPLETE: CPT | Performed by: NUCLEAR MEDICINE

## 2020-07-30 NOTE — PROGRESS NOTES
[address] [address]  Dept: [phone]  Dept Fax: [phone]  Loc: [phone]    Visit Date: 7/30/2020    Carlos De La Cruz is a 38 y.o. female who presents todayfor:  Chief Complaint   Patient presents with    Check-Up    Hypertension    Tachycardia   continues to have multiple symptoms  Had a tilt   Was okay   Did have some tachycardia   Does have fogged feeling   Cant concentrate   Cant focus  No syncope  Does have dizziness  No syncope  Nausea  Echo was okay   Here for more evaluation   BP is higher a little bit         HPI:  HPI  Past Medical History:   Diagnosis Date    Orthostatic hypotension     Syncope     Unspecified sleep apnea       Past Surgical History:   Procedure Laterality Date    CHOLECYSTECTOMY       Family History   Problem Relation Age of Onset    High Blood Pressure Brother     High Blood Pressure Brother      Social History     Tobacco Use    Smoking status: Never Smoker    Smokeless tobacco: Never Used   Substance Use Topics    Alcohol use: Yes     Alcohol/week: 0.0 standard drinks     Comment: occassionally      Current Outpatient Medications   Medication Sig Dispense Refill    metoprolol succinate (TOPROL XL) 50 MG extended release tablet Take 50 in am 25 in PM 45 tablet 11    Fexofenadine HCl (ALLEGRA ALLERGY PO) Take by mouth daily as needed      lamoTRIgine (LAMICTAL) 100 MG tablet Take 3 tablets by mouth 2 times daily 180 tablet 3    Norethin Ace-Eth Estrad-FE (MIBELAS 24 FE PO) Take by mouth      folic acid (FOLVITE) 1 MG tablet Take 1 tablet by mouth daily 90 tablet 3     No current facility-administered medications for this visit.       No Known Allergies  Health Maintenance   Topic Date Due    HIV screen  07/25/1994    DTaP/Tdap/Td vaccine (1 - Tdap) 07/25/1998    Cervical cancer screen  07/25/2000    Lipid screen  07/25/2019    Flu vaccine (1) 09/01/2020    Hepatitis A vaccine  Aged Out    Hepatitis B vaccine  Aged Out    Hib vaccine  Aged Out    Meningococcal (ACWY) vaccine  Aged Out    Pneumococcal 0-64 years Vaccine  Aged Out       Subjective:  Review of Systems  General:   No fever, no chills, some fatigue or weight loss  Pulmonary:    some dyspnea, no wheezing  Cardiac:    Denies recent chest pain,   GI:     some more nausea or vomiting, no abdominal pain  Neuro:    No dizziness or light headedness,   Musculoskeletal:  No recent active issues  Extremities:   No edema, no obvious claudication       Objective:  Physical Exam  /70   Pulse 84   Ht 5' 4\" (1.626 m)   Wt 154 lb 3.2 oz (69.9 kg)   BMI 26.47 kg/m²   General:   Well developed, well nourished  Lungs:   Clear to auscultation  Heart:    Normal S1 S2, Slight murmur. no rubs, no gallops  Abdomen:   Soft, non tender, no organomegalies, positive bowel sounds  Extremities:   No edema, no cyanosis, good peripheral pulses  Neurological:   Awake, alert, oriented. No obvious focal deficits  Musculoskelatal:  No obvious deformities    Assessment:      Diagnosis Orders   1. Essential hypertension  EKG 12 Lead   2. Syncope and collapse  EKG 12 Lead   3. Dizziness  EKG 12 Lead   likely not cardiac related   Possible neurology vs endocrine       Plan:  No follow-ups on file. Discussed  Refer to endocrine   Follow with neurology   Doubt cardiac etiology   Continue risk factor modification and medical management  Thank you for allowing me to participate in the care of your patient. Please don't hesitate to contact me regarding any further issues related to the patient care    Orders Placed:  Orders Placed This Encounter   Procedures    EKG 12 Lead     Order Specific Question:   Reason for Exam?     Answer: Other       Medications Prescribed:  No orders of the defined types were placed in this encounter. Discussed use, benefit, and side effects of prescribed medications.  All patient questions answered. Pt voicedunderstanding. Instructed to continue current medications, diet and exercise. Continue risk factor modification and medical management. Patient agreed with treatment plan. Follow up as directed.     Electronically signedby Dale Edmonds MD on 7/30/2020 at 2:55 PM

## 2020-08-05 ENCOUNTER — PATIENT MESSAGE (OUTPATIENT)
Dept: CARDIOLOGY CLINIC | Age: 40
End: 2020-08-05

## 2020-08-05 NOTE — TELEPHONE ENCOUNTER
From: Mera Pal  To: Jonnathan Olson MD  Sent: 8/5/2020 10:54 AM EDT  Subject: Non-Urgent Medical Question    Hello. I was in the office last week and a referral was going to be sent to [facility] for an endocrinologist. I had not heard from [facility] so i just wanted to follow up to see if that was sent. Thank you.

## 2020-09-23 ENCOUNTER — OFFICE VISIT (OUTPATIENT)
Dept: PULMONOLOGY | Age: 40
End: 2020-09-23
Payer: COMMERCIAL

## 2020-09-23 VITALS
OXYGEN SATURATION: 99 % | WEIGHT: 155.6 LBS | TEMPERATURE: 97.7 F | BODY MASS INDEX: 26.56 KG/M2 | DIASTOLIC BLOOD PRESSURE: 70 MMHG | SYSTOLIC BLOOD PRESSURE: 102 MMHG | HEART RATE: 72 BPM | HEIGHT: 64 IN

## 2020-09-23 PROCEDURE — 99213 OFFICE O/P EST LOW 20 MIN: CPT | Performed by: INTERNAL MEDICINE

## 2020-09-23 NOTE — PATIENT INSTRUCTIONS
Recommendations/Plan:  -At the request of patient, she was given a prescription for a new CPAP machine with current pressure of 7cm H20. The new CPAP machine must have capabilities to give downloads regarding her residual AHI and >4hour compliance. -She was advised to continue current positive airway pressure therapy with above described pressure.  -She was advised to keep good compliance with current recommended pressure to get optimal results and clinical improvement.  -She was instructed to extend her sleep schedule to 7 to 9 hours in a given 24hour period continuously. -Follow with my clinic in 12months for clinical reevaluation with review of download. -She was advised to call [employer] regarding supplies if needed.  -She was advised to call my office for earlier appointment if needed for worsening of sleep symptoms.  -Vannessa Mar was educated about my impression and plan. Patient verbalizes understanding.

## 2020-09-23 NOTE — PROGRESS NOTES
Chief Complaint: Jose Armando baez is here for a machine check follow up with a download    Mallampati airway Class:IV  Neck Circumference: 13.5 Inches    [place] sleepiness score 9/23/20: 6  SAQLI: 91      Diagnostic Data: 6/24/2014 AHI: 5.6  PAP Download:   Recorded compliance dates: 8/24/2020-9/22/2020  More than 4hour usage compliance was:73.3%. Average residual Apnea- Hypoapnea index on current pressue was:1.8.       PAP Type CPAP    Level  7.0     Average usuage hours per day was: 6 hr 38 min 18 sec     Interface: Nasal pillows    Provider:  [x][facility]  [][facility]  [][facility]  [][facility]         [][facility] []Other:

## 2020-10-04 ENCOUNTER — HOSPITAL ENCOUNTER (OUTPATIENT)
Age: 40
Discharge: HOME | End: 2020-10-04
Payer: COMMERCIAL

## 2020-10-04 LAB
CORTISOL COLLECTION INFO: NORMAL
CORTISOL: 25.15 UG/DL

## 2020-10-04 PROCEDURE — 36415 COLL VENOUS BLD VENIPUNCTURE: CPT

## 2020-10-04 PROCEDURE — 82533 TOTAL CORTISOL: CPT

## 2020-10-04 PROCEDURE — 82024 ASSAY OF ACTH: CPT

## 2020-10-05 ENCOUNTER — VIRTUAL VISIT (OUTPATIENT)
Dept: NEUROLOGY | Age: 40
End: 2020-10-05
Payer: COMMERCIAL

## 2020-10-05 ENCOUNTER — HOSPITAL ENCOUNTER (OUTPATIENT)
Age: 40
Setting detail: SPECIMEN
Discharge: HOME | End: 2020-10-05
Payer: COMMERCIAL

## 2020-10-05 PROCEDURE — 83835 ASSAY OF METANEPHRINES: CPT

## 2020-10-05 PROCEDURE — 99213 OFFICE O/P EST LOW 20 MIN: CPT | Performed by: PSYCHIATRY & NEUROLOGY

## 2020-10-05 PROCEDURE — 82570 ASSAY OF URINE CREATININE: CPT

## 2020-10-05 RX ORDER — LAMOTRIGINE 100 MG/1
300 TABLET ORAL 2 TIMES DAILY
Qty: 180 TABLET | Refills: 3 | Status: SHIPPED | OUTPATIENT
Start: 2020-10-05 | End: 2021-04-05 | Stop reason: SDUPTHER

## 2020-10-05 NOTE — PROGRESS NOTES
10/5/2020    TELEHEALTH EVALUATION -- Audio/Visual (During FMVEV-74 public health emergency)    HPI:    Jessica Hicks (:  1980) has requested an audio/video evaluation for the following concern(s):  Follow up for migraine headache,   Seizure-like activity, abnormal EEG. The patient denies any seizure. She reports no new events. She had Bp fluctuations and was seen by cardiology and had tilt table test and cardiac work up by cardiology, her medications changes to 50 mg of metoprolol in am and 25 mg at night. She reports no seizure spells. She is taking her Lamictal. Her symptoms are few and far apart, worse when stressed with the headache, relief is with medications and resting. she is wearing her CPAP. She is taking her Lamictal consistently at 300 mg in the morning, 300 mg at nighttime no side effects reported to the medication. She is also taking folic acid on daily basis with the medication. The patient is evaluated virtually to discuss medication and plan of care going forward. Review of Systems   Constitutional: Negative. Musculoskeletal: Negative. Skin: Negative. Prior to Visit Medications    Medication Sig Taking? Authorizing Provider   metoprolol succinate (TOPROL XL) 50 MG extended release tablet Take 50 in am 25 in PM  Cholo Araiza MD   Fexofenadine HCl (ALLEGRA ALLERGY PO) Take by mouth daily as needed  Historical Provider, MD   lamoTRIgine (LAMICTAL) 100 MG tablet Take 3 tablets by mouth 2 times daily  Kaci Walls MD   Norethin Ace-Eth Estrad-FE (MIBELAS 24 FE PO) Take by mouth  Historical Provider, MD   folic acid (FOLVITE) 1 MG tablet Take 1 tablet by mouth daily  Kaci Walls MD       Social History     Tobacco Use    Smoking status: Never Smoker    Smokeless tobacco: Never Used   Substance Use Topics    Alcohol use:  Yes     Alcohol/week: 0.0 standard drinks     Comment: occassionally    Drug use: No        Past Medical History:   Diagnosis Date    Orthostatic hypotension     Syncope     Unspecified sleep apnea    ,   Past Surgical History:   Procedure Laterality Date    CHOLECYSTECTOMY     ,   Social History     Tobacco Use    Smoking status: Never Smoker    Smokeless tobacco: Never Used   Substance Use Topics    Alcohol use: Yes     Alcohol/week: 0.0 standard drinks     Comment: occassionally    Drug use: No   ,   Family History   Problem Relation Age of Onset    High Blood Pressure Brother     High Blood Pressure Brother        PHYSICAL EXAMINATION:  [ INSTRUCTIONS:  \"[x]\" Indicates a positive item  \"[]\" Indicates a negative item  -- DELETE ALL ITEMS NOT EXAMINED]  Vital Signs: (As obtained by patient/caregiver or practitioner observation)    Blood pressure-  Heart rate-    Respiratory rate-    Temperature-  Pulse oximetry-     Constitutional: [x] Appears well-developed and well-nourished [x] No apparent distress      [] Abnormal-   Mental status  [x] Alert and awake  [x] Oriented to person/place/time [x]Able to follow commands      Eyes:  EOM    [x]  Normal  [] Abnormal-  Sclera  [x]  Normal  [] Abnormal -         Discharge [x]  None visible  [] Abnormal -    HENT:   [x] Normocephalic, atraumatic.   [] Abnormal   [x] Mouth/Throat: Mucous membranes are moist.     External Ears [x] Normal  [] Abnormal-     Neck: [x] No visualized mass     Pulmonary/Chest: [x] Respiratory effort normal.  [x] No visualized signs of difficulty breathing or respiratory distress        [] Abnormal-      Musculoskeletal:   [x] Normal gait with no signs of ataxia         [x] Normal range of motion of neck        [] Abnormal-       Neurological:        [x] No Facial Asymmetry (Cranial nerve 7 motor function) (limited exam to video visit)          [x] No gaze palsy        [] Abnormal-         Skin:        [x] No significant exanthematous lesions or discoloration noted on facial skin         [] Abnormal-            Psychiatric:       [x] Normal Affect [x] No Hallucinations        [] Abnormal-     Other pertinent observable physical exam findings-     ASSESSMENT/PLAN:  1. Seizure-like activity ([address]  2. Syncope due to orthostatic hypotension  She reports no new events. She had Bp fluctuations and was seen by cardiology and had tilt table test and cardiac work up by cardiology, her medications changes to 50 mg of metoprolol in am and 25 mg at night. She reports no seizure spells. She is taking her Lamictal 300 mg twice a day. No side effects reported. We will repeat lamictal level, she is wearing her CPAP. After detailed discussion with patient we agreed on the following plan. Plan:  1. Continue with Lamictal 300 mg twice a day. Refills given. 2. Lamictal level. 3. Measure blood pressure when symptomatic. 4. Continue using you CPAP. 5. Follow up in 6 months or sooner if needed. 6. Report any new symptoms. 7. Call if any questions or concerns. Return in about 6 months (around 4/5/2021). Mallory Comer is a 38 y.o. female being evaluated by a Virtual Visit (video visit) encounter to address concerns as mentioned above. A caregiver was present when appropriate. Due to this being a TeleHealth encounter (During [facility]- public [facility] emergency), evaluation of the following organ systems was limited: Vitals/Constitutional/EENT/Resp/CV/GI//MS/Neuro/Skin/Heme-Lymph-Imm. Pursuant to the emergency declaration under the [facility], [address] [place] [address] and the [email or web address] and [employer] Act, this Virtual Visit was conducted with patient's (and/or legal guardian's) consent, to reduce the patient's risk of exposure to COVID-19 and provide necessary medical care. The patient (and/or legal guardian) has also been advised to contact this office for worsening conditions or problems, and seek emergency medical treatment and/or call 911 if deemed necessary.      Patient identification was verified at the start of the visit: Yes    Total time spent on this encounter: 15 min    Services were provided through a video synchronous discussion virtually to substitute for in-person clinic visit. Patient and provider were located at their individual homes. --Connie Cerda MD on 10/5/2020 at 8:32 AM    An electronic signature was used to authenticate this note.

## 2020-10-06 LAB
CREATININE URINE: 1.6 GM/24HR
CREATININE URINE: 52.1 MG/DL
HOURS COLLECTED: 24 HRS
URINE VOLUME, 24 HOUR: 3000 ML

## 2020-10-07 LAB — ACTH: 33.4 PG/ML (ref 7.2–63.3)

## 2020-10-12 LAB — METANEPHRINES TOTAL URINE: NORMAL

## 2021-04-01 ENCOUNTER — HOSPITAL ENCOUNTER (OUTPATIENT)
Age: 41
Discharge: HOME | End: 2021-04-01
Payer: COMMERCIAL

## 2021-04-01 DIAGNOSIS — R56.9 SEIZURE-LIKE ACTIVITY (HCC): ICD-10-CM

## 2021-04-01 LAB
BASOPHILS # BLD: 0.5 %
BASOPHILS ABSOLUTE: 0 THOU/MM3 (ref 0–0.1)
EOSINOPHIL # BLD: 5.5 %
EOSINOPHILS ABSOLUTE: 0.3 THOU/MM3 (ref 0–0.4)
ERYTHROCYTE [DISTWIDTH] IN BLOOD BY AUTOMATED COUNT: 12.5 % (ref 11.5–14.5)
ERYTHROCYTE [DISTWIDTH] IN BLOOD BY AUTOMATED COUNT: 41.7 FL (ref 35–45)
HCT VFR BLD CALC: 40.5 % (ref 37–47)
HEMOGLOBIN: 12.9 GM/DL (ref 12–16)
IMMATURE GRANS (ABS): 0.02 THOU/MM3 (ref 0–0.07)
IMMATURE GRANULOCYTES: 0.3 %
LYMPHOCYTES # BLD: 39.8 %
LYMPHOCYTES ABSOLUTE: 2.4 THOU/MM3 (ref 1–4.8)
MCH RBC QN AUTO: 29.1 PG (ref 26–33)
MCHC RBC AUTO-ENTMCNC: 31.9 GM/DL (ref 32.2–35.5)
MCV RBC AUTO: 91.4 FL (ref 81–99)
MONOCYTES # BLD: 5.5 %
MONOCYTES ABSOLUTE: 0.3 THOU/MM3 (ref 0.4–1.3)
NUCLEATED RED BLOOD CELLS: 0 /100 WBC
PLATELET # BLD: 232 THOU/MM3 (ref 130–400)
PMV BLD AUTO: 11.1 FL (ref 9.4–12.4)
RBC # BLD: 4.43 MILL/MM3 (ref 4.2–5.4)
SEG NEUTROPHILS: 48.4 %
SEGMENTED NEUTROPHILS ABSOLUTE COUNT: 3 THOU/MM3 (ref 1.8–7.7)
WBC # BLD: 6.1 THOU/MM3 (ref 4.8–10.8)

## 2021-04-01 PROCEDURE — 85025 COMPLETE CBC W/AUTO DIFF WBC: CPT

## 2021-04-01 PROCEDURE — 80076 HEPATIC FUNCTION PANEL: CPT

## 2021-04-01 PROCEDURE — 36415 COLL VENOUS BLD VENIPUNCTURE: CPT

## 2021-04-01 PROCEDURE — 80175 DRUG SCREEN QUAN LAMOTRIGINE: CPT

## 2021-04-02 LAB
ALBUMIN SERPL-MCNC: 4.5 G/DL (ref 3.5–5.1)
ALP BLD-CCNC: 52 U/L (ref 38–126)
ALT SERPL-CCNC: 23 U/L (ref 11–66)
AST SERPL-CCNC: 17 U/L (ref 5–40)
BILIRUB SERPL-MCNC: 0.2 MG/DL (ref 0.3–1.2)
BILIRUBIN DIRECT: < 0.2 MG/DL (ref 0–0.3)
TOTAL PROTEIN: 7.2 G/DL (ref 6.1–8)

## 2021-04-03 LAB — LAMOTRIGINE LEVEL: 11.1 UG/ML (ref 2.5–15)

## 2021-04-05 ENCOUNTER — VIRTUAL VISIT (OUTPATIENT)
Dept: NEUROLOGY | Age: 41
End: 2021-04-05
Payer: COMMERCIAL

## 2021-04-05 DIAGNOSIS — I95.1 SYNCOPE DUE TO ORTHOSTATIC HYPOTENSION: ICD-10-CM

## 2021-04-05 DIAGNOSIS — R56.9 SEIZURE-LIKE ACTIVITY (HCC): Primary | ICD-10-CM

## 2021-04-05 DIAGNOSIS — M51.16 INTERVERTEBRAL DISC DISORDER WITH RADICULOPATHY OF LUMBAR REGION: ICD-10-CM

## 2021-04-05 PROCEDURE — 99213 OFFICE O/P EST LOW 20 MIN: CPT | Performed by: PSYCHIATRY & NEUROLOGY

## 2021-04-05 RX ORDER — LAMOTRIGINE 100 MG/1
300 TABLET ORAL 2 TIMES DAILY
Qty: 180 TABLET | Refills: 3 | Status: SHIPPED | OUTPATIENT
Start: 2021-04-05 | End: 2021-08-09

## 2021-04-05 NOTE — PROGRESS NOTES
2021    TELEHEALTH EVALUATION -- Audio/Visual (During EJUAT-55 public health emergency)    HPI:    Stephen Barnes (:  1980) has requested an audio/video evaluation for the following concern(s):  Follow up for migraine headache,   Seizure-like activity, abnormal EEG. The patient denies any seizure. She reports no new events. She had Bp fluctuations and was seen by cardiology and had tilt table test and cardiac work up by cardiology, her medications changes to 50 mg of metoprolol in am and 25 mg at night. She reports no seizure spells. She is taking her Lamictal. Her symptoms are few and far apart, worse when stressed with the headache, relief is with medications and resting. she is wearing her CPAP. She is taking her Lamictal consistently at 300 mg in the morning, 300 mg at nighttime no side effects reported to the medication. She is also taking folic acid on daily basis with the medication. The patient is evaluated virtually to discuss medication and plan of care going forward. Review of Systems   Constitutional: Negative. Musculoskeletal: Negative. Skin: Negative. Prior to Visit Medications    Medication Sig Taking? Authorizing Provider   lamoTRIgine (LAMICTAL) 100 MG tablet Take 3 tablets by mouth 2 times daily  Joann Guevara MD   metoprolol succinate (TOPROL XL) 50 MG extended release tablet Take 50 in am 25 in PM  Cayla Laguna MD   Fexofenadine HCl (ALLEGRA ALLERGY PO) Take by mouth daily as needed  Historical Provider, MD   Norethin Ace-Eth Estrad-FE (MIBELAS 24 FE PO) Take by mouth  Historical Provider, MD   folic acid (FOLVITE) 1 MG tablet Take 1 tablet by mouth daily  Joann Guevara MD       Social History     Tobacco Use    Smoking status: Never Smoker    Smokeless tobacco: Never Used   Substance Use Topics    Alcohol use:  Yes     Alcohol/week: 0.0 standard drinks     Comment: occassionally    Drug use: No        Past Medical History:   Diagnosis Date    Orthostatic hypotension     Syncope     Unspecified sleep apnea    ,   Past Surgical History:   Procedure Laterality Date    CHOLECYSTECTOMY     ,   Social History     Tobacco Use    Smoking status: Never Smoker    Smokeless tobacco: Never Used   Substance Use Topics    Alcohol use: Yes     Alcohol/week: 0.0 standard drinks     Comment: occassionally    Drug use: No   ,   Family History   Problem Relation Age of Onset    High Blood Pressure Brother     High Blood Pressure Brother        PHYSICAL EXAMINATION:  [ INSTRUCTIONS:  \"[x]\" Indicates a positive item  \"[]\" Indicates a negative item  -- DELETE ALL ITEMS NOT EXAMINED]  Vital Signs: (As obtained by patient/caregiver or practitioner observation)    Blood pressure-  Heart rate-    Respiratory rate-    Temperature-  Pulse oximetry-     Constitutional: [x] Appears well-developed and well-nourished [x] No apparent distress      [] Abnormal-   Mental status  [x] Alert and awake  [x] Oriented to person/place/time [x]Able to follow commands      Eyes:  EOM    [x]  Normal  [] Abnormal-  Sclera  [x]  Normal  [] Abnormal -         Discharge [x]  None visible  [] Abnormal -    HENT:   [x] Normocephalic, atraumatic.   [] Abnormal   [x] Mouth/Throat: Mucous membranes are moist.     External Ears [x] Normal  [] Abnormal-     Neck: [x] No visualized mass     Pulmonary/Chest: [x] Respiratory effort normal.  [x] No visualized signs of difficulty breathing or respiratory distress        [] Abnormal-      Musculoskeletal:   [x] Normal gait with no signs of ataxia         [x] Normal range of motion of neck        [] Abnormal-       Neurological:        [x] No Facial Asymmetry (Cranial nerve 7 motor function) (limited exam to video visit)          [x] No gaze palsy        [] Abnormal-         Skin:        [x] No significant exanthematous lesions or discoloration noted on facial skin         [] Abnormal-            Psychiatric:       [x] Normal Affect [x] No Hallucinations        [] Abnormal-     Other pertinent observable physical exam findings-   Results for orders placed or performed during the hospital encounter of 04/01/21   Lamotrigine Level   Result Value Ref Range    Lamotrigine Lvl 11.1 2.5 - 15.0 ug/mL   Hepatic Function Panel   Result Value Ref Range    Albumin 4.5 3.5 - 5.1 g/dL    Total Bilirubin 0.2 (L) 0.3 - 1.2 mg/dL    Bilirubin, Direct <0.2 0.0 - 0.3 mg/dL    Alkaline Phosphatase 52 38 - 126 U/L    AST 17 5 - 40 U/L    ALT 23 11 - 66 U/L    Total Protein 7.2 6.1 - 8.0 g/dL   CBC Auto Differential   Result Value Ref Range    WBC 6.1 4.8 - 10.8 thou/mm3    RBC 4.43 4.20 - 5.40 mill/mm3    Hemoglobin 12.9 12.0 - 16.0 gm/dl    Hematocrit 40.5 37.0 - 47.0 %    MCV 91.4 81.0 - 99.0 fL    MCH 29.1 26.0 - 33.0 pg    MCHC 31.9 (L) 32.2 - 35.5 gm/dl    RDW-CV 12.5 11.5 - 14.5 %    RDW-SD 41.7 35.0 - 45.0 fL    Platelets 474 434 - 312 thou/mm3    MPV 11.1 9.4 - 12.4 fL    Seg Neutrophils 48.4 %    Lymphocytes 39.8 %    Monocytes 5.5 %    Eosinophils 5.5 %    Basophils 0.5 %    Immature Granulocytes 0.3 %    Segs Absolute 3.0 1 - 7 thou/mm3    Lymphocytes Absolute 2.4 1.0 - 4.8 thou/mm3    Monocytes Absolute 0.3 (L) 0.4 - 1.3 thou/mm3    Eosinophils Absolute 0.3 0.0 - 0.4 thou/mm3    Basophils Absolute 0.0 0.0 - 0.1 thou/mm3    Immature Grans (Abs) 0.02 0.00 - 0.07 thou/mm3    nRBC 0 /100 wbc      ASSESSMENT/PLAN:  1. Seizure-like activity ([address]  2. Syncope due to orthostatic hypotension  She reports no new events. She is still taking the Lamictal and does not report any new event. Her Bp fluctuations are better. She sees cardiology once a year now. She is on 50 mg of metoprolol in am and 25 mg at night. I have reviewed the patient's laboratory data CBC, hepatic panel, and Lamictal level performed on 4/1/2021. she reports no seizure spells. She is taking her Lamictal 300 mg twice a day. No side effects reported. She is wearing her CPAP.  After detailed discussion with patient we agreed on the following plan. Plan:  1. Continue with Lamictal 300 mg twice a day. Refills given. 2. Lamictal level. CBC and hepatic panel prior to next visit. 3. Measure blood pressure when symptomatic, and follow up with cardiology as planned. 4. Continue using you CPAP. 5. Follow up in 6 months or sooner if needed. 6. Report any new symptoms. 7. Call if any questions or concerns. Return in about 6 months (around 10/5/2021). Dellar Cheadle is a 38 y.o. female being evaluated by a Virtual Visit (video visit) encounter to address concerns as mentioned above. A caregiver was present when appropriate. Due to this being a TeleHealth encounter (During YLLJQ-17 public health emergency), evaluation of the following organ systems was limited: Vitals/Constitutional/EENT/Resp/CV/GI//MS/Neuro/Skin/Heme-Lymph-Imm. Pursuant to the emergency declaration under the [address] [place] [address], [address] [place] [address] and the [employer] and [employer] Act, this Virtual Visit was conducted with patient's (and/or legal guardian's) consent, to reduce the patient's risk of exposure to COVID-19 and provide necessary medical care. The patient (and/or legal guardian) has also been advised to contact this office for worsening conditions or problems, and seek emergency medical treatment and/or call 911 if deemed necessary. Patient identification was verified at the start of the visit: Yes    Total time spent on this encounter: 22 min    Services were provided through a video synchronous discussion virtually to substitute for in-person clinic visit. Patient and provider were located at their individual homes. --Remy Greenwood MD on 4/5/2021 at 10:43 AM    An electronic signature was used to authenticate this note.

## 2021-04-05 NOTE — PATIENT INSTRUCTIONS
1.  Continue with Lamictal 300 mg twice a day. Refills given. 2. Lamictal level. CBC and hepatic panel prior to next visit. 3. Measure blood pressure when symptomatic, and follow up with cardiology as planned. 4. Continue using you CPAP. 5. Follow up in 6 months or sooner if needed. 6. Report any new symptoms. 7. Call if any questions or concerns.

## 2021-07-27 RX ORDER — METOPROLOL SUCCINATE 50 MG/1
TABLET, EXTENDED RELEASE ORAL
Qty: 135 TABLET | Refills: 0 | Status: SHIPPED | OUTPATIENT
Start: 2021-07-27 | End: 2021-10-22 | Stop reason: SDUPTHER

## 2021-08-07 DIAGNOSIS — I95.1 SYNCOPE DUE TO ORTHOSTATIC HYPOTENSION: ICD-10-CM

## 2021-08-07 DIAGNOSIS — R56.9 SEIZURE-LIKE ACTIVITY (HCC): ICD-10-CM

## 2021-08-09 RX ORDER — LAMOTRIGINE 100 MG/1
TABLET ORAL
Qty: 180 TABLET | Refills: 2 | Status: SHIPPED | OUTPATIENT
Start: 2021-08-09 | End: 2021-10-18 | Stop reason: SDUPTHER

## 2021-10-18 ENCOUNTER — OFFICE VISIT (OUTPATIENT)
Dept: NEUROLOGY | Age: 41
End: 2021-10-18
Payer: COMMERCIAL

## 2021-10-18 VITALS
HEART RATE: 78 BPM | WEIGHT: 153 LBS | DIASTOLIC BLOOD PRESSURE: 82 MMHG | HEIGHT: 64 IN | SYSTOLIC BLOOD PRESSURE: 120 MMHG | BODY MASS INDEX: 26.12 KG/M2

## 2021-10-18 DIAGNOSIS — I95.1 SYNCOPE DUE TO ORTHOSTATIC HYPOTENSION: ICD-10-CM

## 2021-10-18 DIAGNOSIS — R56.9 SEIZURE-LIKE ACTIVITY (HCC): Primary | ICD-10-CM

## 2021-10-18 PROCEDURE — 99213 OFFICE O/P EST LOW 20 MIN: CPT | Performed by: NURSE PRACTITIONER

## 2021-10-18 RX ORDER — LAMOTRIGINE 100 MG/1
TABLET ORAL
Qty: 180 TABLET | Refills: 2 | Status: SHIPPED | OUTPATIENT
Start: 2021-10-18 | End: 2022-01-28

## 2021-10-18 RX ORDER — CETIRIZINE HYDROCHLORIDE 10 MG/1
10 TABLET ORAL DAILY
COMMUNITY

## 2021-10-18 NOTE — PROGRESS NOTES
NEUROLOGY OUT PATIENT FOLLOW UP NOTE:  10/18/107017:42 AM    Edgar Varner is here for follow up for migraine headache,  Seizure-like activity, syncope and collapse. ROS:  Respiratory : no cough, no shortness of breath  Cardiac: no chest pain. No palpitations. Renal : no flank pain, no hematuria    Skin: no rash      No Known Allergies    Current Outpatient Medications:     cetirizine (ZYRTEC) 10 MG tablet, Take 10 mg by mouth daily, Disp: , Rfl:     lamoTRIgine (LAMICTAL) 100 MG tablet, TAKE 3 TABLETS BY MOUTH TWICE DAILY, Disp: 180 tablet, Rfl: 2    metoprolol succinate (TOPROL XL) 50 MG extended release tablet, TAKE 1 TABLET BY MOUTH IN THE MORNING AND 1/2 TABLET IN THE EVENING, Disp: 135 tablet, Rfl: 0    Norethin Ace-Eth Estrad-FE (MIBELAS 24 FE PO), Take by mouth, Disp: , Rfl:     folic acid (FOLVITE) 1 MG tablet, Take 1 tablet by mouth daily, Disp: 90 tablet, Rfl: 3    I reviewed the past medical history, allergies, medications, social history and family history. PE:   Vitals:    10/18/21 1136   BP: 120/82   Pulse: 78   Weight: 153 lb (69.4 kg)   Height: 5' 4\" (1.626 m)     General Appearance:  awake, alert, oriented, in no acute distress  Gen: NAD, Language is Intact. Skin: no rash, lesion, dry  to touch. warm  Head: no rash, no icterus  Neck: There is no carotid bruits. The Neck is supple. There is no neck lymphadenopathy. Neuro: CN 2-12 grossly intact with no focal deficits. Power 5/5 Throughout symmetric, Reflexes are  symmetric. Long tracts are intact. Cerebellar exam is Intact. Sensory exam is intact to light touch. Gait is intact. Musculoskeletal:  Has no hand arthritis, no limitation of ROM in any of the four extremities.   Lower extremities no edema        DATA:      Results for orders placed or performed during the hospital encounter of 04/01/21   Lamotrigine Level   Result Value Ref Range    Lamotrigine Lvl 11.1 2.5 - 15.0 ug/mL   Hepatic Function Panel   Result Value Ref Range    Albumin 4.5 3.5 - 5.1 g/dL    Total Bilirubin 0.2 (L) 0.3 - 1.2 mg/dL    Bilirubin, Direct <0.2 0.0 - 0.3 mg/dL    Alkaline Phosphatase 52 38 - 126 U/L    AST 17 5 - 40 U/L    ALT 23 11 - 66 U/L    Total Protein 7.2 6.1 - 8.0 g/dL   CBC Auto Differential   Result Value Ref Range    WBC 6.1 4.8 - 10.8 thou/mm3    RBC 4.43 4.20 - 5.40 mill/mm3    Hemoglobin 12.9 12.0 - 16.0 gm/dl    Hematocrit 40.5 37.0 - 47.0 %    MCV 91.4 81.0 - 99.0 fL    MCH 29.1 26.0 - 33.0 pg    MCHC 31.9 (L) 32.2 - 35.5 gm/dl    RDW-CV 12.5 11.5 - 14.5 %    RDW-SD 41.7 35.0 - 45.0 fL    Platelets 488 719 - 428 thou/mm3    MPV 11.1 9.4 - 12.4 fL    Seg Neutrophils 48.4 %    Lymphocytes 39.8 %    Monocytes 5.5 %    Eosinophils 5.5 %    Basophils 0.5 %    Immature Granulocytes 0.3 %    Segs Absolute 3.0 1 - 7 thou/mm3    Lymphocytes Absolute 2.4 1.0 - 4.8 thou/mm3    Monocytes Absolute 0.3 (L) 0.4 - 1.3 thou/mm3    Eosinophils Absolute 0.3 0.0 - 0.4 thou/mm3    Basophils Absolute 0.0 0.0 - 0.1 thou/mm3    Immature Grans (Abs) 0.02 0.00 - 0.07 thou/mm3    nRBC 0 /100 wbc               Assessment:     Diagnosis Orders   1. Seizure-like activity (HCC)  Lamotrigine Level    CBC With Auto Differential    Hepatic Function Panel   2. Syncope due to orthostatic hypotension  Lamotrigine Level    CBC With Auto Differential    Hepatic Function Panel        She is doing well. She reports no new events. She is still taking the Lamictal  And her Bp fluctuations are better. She sees cardiology once a year now. She is on 50 mg of metoprolol in am and 25 mg at night. She is tolerating the medications well. She is not wearing her CPAP consistently as she has been working at least 60 hours a week and has been helping out on second shift. Overall, she is pleased with how she is doing. After detailed discussion with patient we agreed on the following plan. Plan:  1. Continue with Lamictal 300 mg twice a day. Refills given. 2. Lamictal level.  CBC and hepatic panel prior to next visit. 3. Measure blood pressure when symptomatic, and follow up with cardiology as planned. 4. Continue using you CPAP. 5. Follow up in 9 months or sooner if needed. 6. Report any new symptoms. 7. Call if any questions or concerns.       Total time 26 min    JACINTO Salas - CNP

## 2021-10-18 NOTE — PATIENT INSTRUCTIONS
1.  Continue with Lamictal 300 mg twice a day. Refills given. 2. Lamictal level. CBC and hepatic panel prior to next visit. 3. Measure blood pressure when symptomatic, and follow up with cardiology as planned. 4. Continue using you CPAP. 5. Follow up in 9 months or sooner if needed. 6. Report any new symptoms. 7. Call if any questions or concerns.

## 2021-10-22 ENCOUNTER — OFFICE VISIT (OUTPATIENT)
Dept: CARDIOLOGY CLINIC | Age: 41
End: 2021-10-22
Payer: COMMERCIAL

## 2021-10-22 VITALS
HEIGHT: 64 IN | HEART RATE: 79 BPM | DIASTOLIC BLOOD PRESSURE: 95 MMHG | SYSTOLIC BLOOD PRESSURE: 128 MMHG | BODY MASS INDEX: 25.95 KG/M2 | WEIGHT: 152 LBS

## 2021-10-22 DIAGNOSIS — R55 SYNCOPE, UNSPECIFIED SYNCOPE TYPE: Primary | ICD-10-CM

## 2021-10-22 DIAGNOSIS — I10 PRIMARY HYPERTENSION: ICD-10-CM

## 2021-10-22 DIAGNOSIS — R00.0 TACHYCARDIA: ICD-10-CM

## 2021-10-22 PROCEDURE — 93000 ELECTROCARDIOGRAM COMPLETE: CPT | Performed by: NUCLEAR MEDICINE

## 2021-10-22 PROCEDURE — 99213 OFFICE O/P EST LOW 20 MIN: CPT | Performed by: NUCLEAR MEDICINE

## 2021-10-22 RX ORDER — METOPROLOL SUCCINATE 50 MG/1
TABLET, EXTENDED RELEASE ORAL
Qty: 135 TABLET | Refills: 5 | Status: SHIPPED | OUTPATIENT
Start: 2021-10-22

## 2021-10-22 NOTE — PROGRESS NOTES
[id number] [facility] .  [address] [address]  Dept: [phone]  Dept Fax: [phone]  Loc: [phone]    Visit Date: 10/22/2021    Nicolasa Santizo is a 42 y.o. female who presents todayfor:  Chief Complaint   Patient presents with    Follow-up    Hypertension    Tachycardia   seen for tachycardia  BP is stable   No chest pain   No changes in breathing  No new symptoms  She is active        HPI:  HPI  Past Medical History:   Diagnosis Date    Orthostatic hypotension     Syncope     Unspecified sleep apnea       Past Surgical History:   Procedure Laterality Date    CHOLECYSTECTOMY       Family History   Problem Relation Age of Onset    High Blood Pressure Brother     High Blood Pressure Brother      Social History     Tobacco Use    Smoking status: Never Smoker    Smokeless tobacco: Never Used   Substance Use Topics    Alcohol use: Yes     Alcohol/week: 0.0 standard drinks     Comment: occassionally      Current Outpatient Medications   Medication Sig Dispense Refill    cetirizine (ZYRTEC) 10 MG tablet Take 10 mg by mouth daily      lamoTRIgine (LAMICTAL) 100 MG tablet TAKE 3 TABLETS BY MOUTH TWICE DAILY 180 tablet 2    metoprolol succinate (TOPROL XL) 50 MG extended release tablet TAKE 1 TABLET BY MOUTH IN THE MORNING AND 1/2 TABLET IN THE EVENING 135 tablet 0    Norethin Ace-Eth Estrad-FE (MIBELAS 24 FE PO) Take by mouth      folic acid (FOLVITE) 1 MG tablet Take 1 tablet by mouth daily 90 tablet 3     No current facility-administered medications for this visit.      No Known Allergies  Health Maintenance   Topic Date Due    Hepatitis C screen  Never done    HIV screen  Never done    DTaP/Tdap/Td vaccine (1 - Tdap) Never done    Cervical cancer screen  Never done    Lipid screen  07/25/2019    Flu vaccine (1) Never done    Diabetes screen  06/10/2023    COVID-19 Vaccine  Completed    Hepatitis A vaccine Aged Out    Hepatitis B vaccine  Aged Out    Hib vaccine  Aged Out    Meningococcal (ACWY) vaccine  Aged Out    Pneumococcal 0-64 years Vaccine  Aged Out       Subjective:  Review of Systems  General:   No fever, no chills, No fatigue or weight loss  Pulmonary:    No dyspnea, no wheezing  Cardiac:    Denies recent chest pain,   GI:     No nausea or vomiting, no abdominal pain  Neuro:    No dizziness or light headedness,   Musculoskeletal:  No recent active issues  Extremities:   No edema, no obvious claudication       Objective:  Physical Exam  BP (!) 128/95   Pulse 79   Ht 5' 4\" (1.626 m)   Wt 152 lb (68.9 kg)   BMI 26.09 kg/m²   General:   Well developed, well nourished  Lungs:   Clear to auscultation  Heart:    Normal S1 S2, Slight murmur. no rubs, no gallops  Abdomen:   Soft, non tender, no organomegalies, positive bowel sounds  Extremities:   No edema, no cyanosis, good peripheral pulses  Neurological:   Awake, alert, oriented. No obvious focal deficits  Musculoskelatal:  No obvious deformities    Assessment:      Diagnosis Orders   1. Syncope, unspecified syncope type  EKG 12 Lead   2. Primary hypertension     3. Tachycardia     as above  Cardiac fair for now   ECG in office was done today. I reviewed the ECG. No acute findings      Plan:  No follow-ups on file. As above  Continue risk factor modification and medical management  Thank you for allowing me to participate in the care of your patient. Please don't hesitate to contact me regarding any further issues related to the patient care    Orders Placed:  Orders Placed This Encounter   Procedures    EKG 12 Lead     Order Specific Question:   Reason for Exam?     Answer: Other       Medications Prescribed:  No orders of the defined types were placed in this encounter. Discussed use, benefit, and side effects of prescribed medications. All patient questions answered. Pt voicedunderstanding.  Instructed to continue current medications, diet and exercise. Continue risk factor modification and medical management. Patient agreed with treatment plan. Follow up as directed.     Electronically signedby Jesus Luther MD on 10/22/2021 at 7:52 AM

## 2021-10-31 ENCOUNTER — HOSPITAL ENCOUNTER (OUTPATIENT)
Age: 41
End: 2021-10-31
Payer: COMMERCIAL

## 2022-01-28 DIAGNOSIS — I95.1 SYNCOPE DUE TO ORTHOSTATIC HYPOTENSION: ICD-10-CM

## 2022-01-28 DIAGNOSIS — R56.9 SEIZURE-LIKE ACTIVITY (HCC): Primary | ICD-10-CM

## 2022-01-28 RX ORDER — LAMOTRIGINE 100 MG/1
TABLET ORAL
Qty: 180 TABLET | Refills: 5 | Status: SHIPPED | OUTPATIENT
Start: 2022-01-28 | End: 2022-08-01

## 2022-07-31 DIAGNOSIS — R56.9 SEIZURE-LIKE ACTIVITY (HCC): ICD-10-CM

## 2022-07-31 DIAGNOSIS — I95.1 SYNCOPE DUE TO ORTHOSTATIC HYPOTENSION: ICD-10-CM

## 2022-08-01 RX ORDER — LAMOTRIGINE 100 MG/1
TABLET ORAL
Qty: 180 TABLET | Refills: 1 | Status: SHIPPED | OUTPATIENT
Start: 2022-08-01 | End: 2022-09-26

## 2022-08-02 ENCOUNTER — HOSPITAL ENCOUNTER (OUTPATIENT)
Age: 42
Discharge: HOME | End: 2022-08-02
Payer: COMMERCIAL

## 2022-08-02 DIAGNOSIS — R56.9 SEIZURE-LIKE ACTIVITY (HCC): ICD-10-CM

## 2022-08-02 DIAGNOSIS — I95.1 SYNCOPE DUE TO ORTHOSTATIC HYPOTENSION: ICD-10-CM

## 2022-08-02 LAB
ALBUMIN SERPL-MCNC: 4.3 G/DL (ref 3.5–5.1)
ALP BLD-CCNC: 53 U/L (ref 38–126)
ALT SERPL-CCNC: 14 U/L (ref 11–66)
AST SERPL-CCNC: 13 U/L (ref 5–40)
BASOPHILS # BLD: 0.9 %
BASOPHILS ABSOLUTE: 0.1 THOU/MM3 (ref 0–0.1)
BILIRUB SERPL-MCNC: 0.4 MG/DL (ref 0.3–1.2)
BILIRUBIN DIRECT: < 0.2 MG/DL (ref 0–0.3)
EOSINOPHIL # BLD: 5.6 %
EOSINOPHILS ABSOLUTE: 0.3 THOU/MM3 (ref 0–0.4)
ERYTHROCYTE [DISTWIDTH] IN BLOOD BY AUTOMATED COUNT: 13.2 % (ref 11.5–14.5)
ERYTHROCYTE [DISTWIDTH] IN BLOOD BY AUTOMATED COUNT: 45.3 FL (ref 35–45)
HCT VFR BLD CALC: 41.2 % (ref 37–47)
HEMOGLOBIN: 13.3 GM/DL (ref 12–16)
IMMATURE GRANS (ABS): 0.01 THOU/MM3 (ref 0–0.07)
IMMATURE GRANULOCYTES: 0.2 %
LYMPHOCYTES # BLD: 33.5 %
LYMPHOCYTES ABSOLUTE: 1.9 THOU/MM3 (ref 1–4.8)
MCH RBC QN AUTO: 30 PG (ref 26–33)
MCHC RBC AUTO-ENTMCNC: 32.3 GM/DL (ref 32.2–35.5)
MCV RBC AUTO: 92.8 FL (ref 81–99)
MONOCYTES # BLD: 6.8 %
MONOCYTES ABSOLUTE: 0.4 THOU/MM3 (ref 0.4–1.3)
NUCLEATED RED BLOOD CELLS: 0 /100 WBC
PLATELET # BLD: 207 THOU/MM3 (ref 130–400)
PMV BLD AUTO: 10.9 FL (ref 9.4–12.4)
RBC # BLD: 4.44 MILL/MM3 (ref 4.2–5.4)
SEG NEUTROPHILS: 53 %
SEGMENTED NEUTROPHILS ABSOLUTE COUNT: 3 THOU/MM3 (ref 1.8–7.7)
TOTAL PROTEIN: 6.8 G/DL (ref 6.1–8)
WBC # BLD: 5.6 THOU/MM3 (ref 4.8–10.8)

## 2022-08-02 PROCEDURE — 85025 COMPLETE CBC W/AUTO DIFF WBC: CPT

## 2022-08-02 PROCEDURE — 80175 DRUG SCREEN QUAN LAMOTRIGINE: CPT

## 2022-08-02 PROCEDURE — 80076 HEPATIC FUNCTION PANEL: CPT

## 2022-08-02 PROCEDURE — 36415 COLL VENOUS BLD VENIPUNCTURE: CPT

## 2022-08-05 LAB — LAMOTRIGINE LEVEL: 11.6 UG/ML (ref 3–15)

## 2022-08-08 ENCOUNTER — OFFICE VISIT (OUTPATIENT)
Dept: NEUROLOGY | Age: 42
End: 2022-08-08
Payer: COMMERCIAL

## 2022-08-08 VITALS
BODY MASS INDEX: 26.4 KG/M2 | HEART RATE: 103 BPM | RESPIRATION RATE: 17 BRPM | SYSTOLIC BLOOD PRESSURE: 112 MMHG | DIASTOLIC BLOOD PRESSURE: 80 MMHG | HEIGHT: 64 IN | WEIGHT: 154.6 LBS | OXYGEN SATURATION: 99 %

## 2022-08-08 DIAGNOSIS — I95.1 SYNCOPE DUE TO ORTHOSTATIC HYPOTENSION: ICD-10-CM

## 2022-08-08 DIAGNOSIS — R56.9 SEIZURE-LIKE ACTIVITY (HCC): Primary | ICD-10-CM

## 2022-08-08 PROCEDURE — 99213 OFFICE O/P EST LOW 20 MIN: CPT | Performed by: PSYCHIATRY & NEUROLOGY

## 2022-08-08 NOTE — PROGRESS NOTES
NEUROLOGY OUT PATIENT FOLLOW UP NOTE:  8/8/20229:51 AM    Akanksha Wang is here for follow up for migraine headache,  Seizure-like activity, syncope and collapse. No Known Allergies    Current Outpatient Medications:     lamoTRIgine (LAMICTAL) 100 MG tablet, TAKE 3 TABLETS BY MOUTH TWICE DAILY, Disp: 180 tablet, Rfl: 1    metoprolol succinate (TOPROL XL) 50 MG extended release tablet, TAKE 1 TABLET BY MOUTH IN THE MORNING AND 1/2 TABLET IN THE EVENING, Disp: 135 tablet, Rfl: 5    cetirizine (ZYRTEC) 10 MG tablet, Take 10 mg by mouth daily, Disp: , Rfl:     Norethin Ace-Eth Estrad-FE (MIBELAS 24 FE PO), Take by mouth, Disp: , Rfl:     folic acid (FOLVITE) 1 MG tablet, Take 1 tablet by mouth daily, Disp: 90 tablet, Rfl: 3    I reviewed the past medical history, allergies, medications, social history and family history. PE:   Vitals:    08/08/22 0947   BP: 112/80   Pulse: (!) 103   Resp: 17   SpO2: 99%   Weight: 154 lb 9.6 oz (70.1 kg)   Height: 5' 4\" (1.626 m)     General Appearance:  awake, alert, oriented, in no acute distress  Gen: NAD, Language is Intact. Skin: no rash, lesion, dry  to touch. warm  Head: no rash, no icterus  Neck: There is no carotid bruits. The Neck is supple. There is no neck lymphadenopathy. Neuro: CN 2-12 grossly intact with no focal deficits. Power 5/5 Throughout symmetric, Reflexes are  symmetric. Long tracts are intact. Cerebellar exam is Intact. Sensory exam is intact to light touch. Gait is intact. Musculoskeletal:  Has no hand arthritis, no limitation of ROM in any of the four extremities.   Lower extremities no edema        DATA:      Results for orders placed or performed during the hospital encounter of 08/02/22   Lamotrigine Level   Result Value Ref Range    Lamotrigine Lvl 11.6 3.0 - 15.0 ug/mL   CBC With Auto Differential   Result Value Ref Range    WBC 5.6 4.8 - 10.8 thou/mm3    RBC 4.44 4.20 - 5.40 mill/mm3    Hemoglobin 13.3 12.0 - 16.0 gm/dl Hematocrit 41.2 37.0 - 47.0 %    MCV 92.8 81.0 - 99.0 fL    MCH 30.0 26.0 - 33.0 pg    MCHC 32.3 32.2 - 35.5 gm/dl    RDW-CV 13.2 11.5 - 14.5 %    RDW-SD 45.3 (H) 35.0 - 45.0 fL    Platelets 780 722 - 214 thou/mm3    MPV 10.9 9.4 - 12.4 fL    Seg Neutrophils 53.0 %    Lymphocytes 33.5 %    Monocytes 6.8 %    Eosinophils 5.6 %    Basophils 0.9 %    Immature Granulocytes 0.2 %    Segs Absolute 3.0 1.8 - 7.7 thou/mm3    Lymphocytes Absolute 1.9 1.0 - 4.8 thou/mm3    Monocytes Absolute 0.4 0.4 - 1.3 thou/mm3    Eosinophils Absolute 0.3 0.0 - 0.4 thou/mm3    Basophils Absolute 0.1 0.0 - 0.1 thou/mm3    Immature Grans (Abs) 0.01 0.00 - 0.07 thou/mm3    nRBC 0 /100 wbc   Hepatic Function Panel   Result Value Ref Range    Albumin 4.3 3.5 - 5.1 g/dL    Total Bilirubin 0.4 0.3 - 1.2 mg/dL    Bilirubin, Direct <0.2 0.0 - 0.3 mg/dL    Alkaline Phosphatase 53 38 - 126 U/L    AST 13 5 - 40 U/L    ALT 14 11 - 66 U/L    Total Protein 6.8 6.1 - 8.0 g/dL               Assessment:     Diagnosis Orders   1. Seizure-like activity ([facility] [address]        2. Syncope due to orthostatic hypotension           Follow up for seizure like activity, syncope. She is on Lamictal tolerated well no side effects. She denies any new spells. She is not using the CPAP, could not sleep with it. She is on 50 mg of metoprolol in am and 25 mg at night. She is tolerating the medications well. Exam is non focal.  Most recent Lamictal level was 11.2 on 8/2/2022, CBC, and hepatic panel were satisfactory. She denies any symptoms. After detailed discussion with patient we agreed on the following plan. Plan:   Continue with Lamictal 300 mg twice a day. Refills given. Lamictal level. CBC and hepatic panel prior to next visit. Measure blood pressure when symptomatic, and follow up with cardiology as planned. Follow up in 9 months or sooner if needed. Report any new symptoms. Call if any questions or concerns.       Total time 22 min    Russell Tillman MD

## 2022-09-26 DIAGNOSIS — I95.1 SYNCOPE DUE TO ORTHOSTATIC HYPOTENSION: ICD-10-CM

## 2022-09-26 DIAGNOSIS — R56.9 SEIZURE-LIKE ACTIVITY (HCC): ICD-10-CM

## 2022-09-26 RX ORDER — LAMOTRIGINE 100 MG/1
TABLET ORAL
Qty: 180 TABLET | Refills: 5 | Status: SHIPPED | OUTPATIENT
Start: 2022-09-26

## 2023-01-17 RX ORDER — METOPROLOL SUCCINATE 50 MG/1
TABLET, EXTENDED RELEASE ORAL
Qty: 135 TABLET | Refills: 0 | OUTPATIENT
Start: 2023-01-17

## 2023-02-08 ENCOUNTER — NURSE ONLY (OUTPATIENT)
Dept: LAB | Age: 43
End: 2023-02-08

## 2023-02-20 LAB — CYTOLOGY THIN PREP PAP: NORMAL

## 2023-03-20 DIAGNOSIS — I95.1 SYNCOPE DUE TO ORTHOSTATIC HYPOTENSION: ICD-10-CM

## 2023-03-20 DIAGNOSIS — R56.9 SEIZURE-LIKE ACTIVITY (HCC): ICD-10-CM

## 2023-03-21 RX ORDER — LAMOTRIGINE 100 MG/1
TABLET ORAL
Qty: 180 TABLET | Refills: 5 | Status: SHIPPED | OUTPATIENT
Start: 2023-03-21

## 2023-05-08 ENCOUNTER — OFFICE VISIT (OUTPATIENT)
Dept: NEUROLOGY | Age: 43
End: 2023-05-08
Payer: COMMERCIAL

## 2023-05-08 VITALS
BODY MASS INDEX: 25.78 KG/M2 | HEIGHT: 64 IN | HEART RATE: 77 BPM | DIASTOLIC BLOOD PRESSURE: 65 MMHG | SYSTOLIC BLOOD PRESSURE: 105 MMHG | OXYGEN SATURATION: 99 % | WEIGHT: 151 LBS

## 2023-05-08 DIAGNOSIS — R56.9 SEIZURE-LIKE ACTIVITY (HCC): Primary | ICD-10-CM

## 2023-05-08 DIAGNOSIS — I95.1 SYNCOPE DUE TO ORTHOSTATIC HYPOTENSION: ICD-10-CM

## 2023-05-08 PROCEDURE — 99213 OFFICE O/P EST LOW 20 MIN: CPT | Performed by: NURSE PRACTITIONER

## 2023-05-08 RX ORDER — LAMOTRIGINE 100 MG/1
TABLET ORAL
Qty: 180 TABLET | Refills: 5 | Status: SHIPPED | OUTPATIENT
Start: 2023-05-08

## 2023-05-08 NOTE — PROGRESS NOTES
NEUROLOGY OUT PATIENT FOLLOW UP NOTE:  5/8/20239:06 AM    Kaia Lerma is here for follow up for migraine headache,  Seizure-like activity, syncope and collapse. No Known Allergies    Current Outpatient Medications:     lamoTRIgine (LAMICTAL) 100 MG tablet, TAKE 3 TABLETS BY MOUTH TWICE DAILY, Disp: 180 tablet, Rfl: 5    metoprolol succinate (TOPROL XL) 50 MG extended release tablet, TAKE 1 TABLET BY MOUTH IN THE MORNING AND 1/2 TABLET IN THE EVENING, Disp: 135 tablet, Rfl: 5    cetirizine (ZYRTEC) 10 MG tablet, Take 1 tablet by mouth daily, Disp: , Rfl:     Norethin Ace-Eth Estrad-FE (MIBELAS 24 FE PO), Take by mouth, Disp: , Rfl:     folic acid (FOLVITE) 1 MG tablet, Take 1 tablet by mouth daily, Disp: 90 tablet, Rfl: 3    I reviewed the past medical history, allergies, medications, social history and family history. PE:   Vitals:    05/08/23 0858   BP: 105/65   Site: Left Upper Arm   Position: Sitting   Cuff Size: Medium Adult   Pulse: 77   SpO2: 99%   Weight: 151 lb (68.5 kg)   Height: 5' 4\" (1.626 m)     General Appearance:  awake, alert, oriented   Gen: NAD, Language is Intact. Skin: no rash, lesion, dry  to touch. warm  Head: no rash, no icterus  Neck: There is no carotid bruits. The Neck is supple. Neuro: CN 2-12 grossly intact with no focal deficits. Power 5/5 Throughout symmetric, Reflexes are  symmetric. Long tracts are intact. Cerebellar exam is Intact. Sensory exam is intact to light touch. Gait is intact. Musculoskeletal:  Has no hand arthritis, no limitation of ROM in any of the four extremities. Lower extremities no edema          DATA:       Results for orders placed or performed in visit on 02/08/23   PAP SMEAR   Result Value Ref Range    Cytology Thin Prep SEE BELOW                   Assessment:     Diagnosis Orders   1. Seizure-like activity (HCC)  CBC with Auto Differential    Hepatic Function Panel    Lamotrigine Level    lamoTRIgine (LAMICTAL) 100 MG tablet      2.

## 2023-05-08 NOTE — PATIENT INSTRUCTIONS
Continue with Lamictal 300 mg twice a day. Refills given. Lamictal level. CBC and hepatic panel   Measure blood pressure when symptomatic   Follow up in 9 months or sooner if needed. Report any new symptoms. Call if any questions or concerns.

## 2023-07-25 ENCOUNTER — TELEPHONE (OUTPATIENT)
Dept: FAMILY MEDICINE CLINIC | Age: 43
End: 2023-07-25

## 2023-07-25 NOTE — TELEPHONE ENCOUNTER
----- Message from [facility] sent at 7/25/2023  1:12 PM EDT -----  Subject: Message to Provider    QUESTIONS  Information for Provider? patient called in to see if Hollie Sims would   take her and her family on as patients . Mondays are typically her day   off. she would need for the whole family to be patients Amanda Suellen   [birth year]/2004 waqar Ken 06/29/2007 Shannan Peña 08/05/1977 she   would like them to be patients also please follow up to advise asap  ---------------------------------------------------------------------------  --------------  Lorenzo Earlimart Santino  [phone]; OK to leave message on voicemail  ---------------------------------------------------------------------------  --------------  SCRIPT ANSWERS  Relationship to Patient?  Self

## 2023-10-03 NOTE — PROGRESS NOTES
[address] [facility]  [address] [facility]  Dept: [phone]  Dept Fax: [phone]  Loc: [phone]    Joycelyn Pisano is a 39 y.o. female who presents today for:  Chief Complaint   Patient presents with    Established New Doctor       HPI:     HPI  Well Adult Physical: Patient here for a comprehensive physical exam.The patient reports problems - see below  Do you take any herbs or supplements that were not prescribed by a doctor? no Are you taking calcium supplements? no Are you taking aspirin daily? no   History:  LMP: No LMP recorded. (Menstrual status: Other - See Notes). Last pap date: 23  Abnormal pap? no  : 2  Para: 2  Any STD's in the past? none    She has a history of episodes of dizziness and passing out. The cardiologist is not convinced that it is cardiac in origin. She is still seeing neurology. Taking lamictal ad toprol. SANTANA should be using the CPAP but she refuses. Reviewed chart forpast medical history , surgical history , allergies, social history , family history and medications. Health Maintenance   Topic Date Due    Hepatitis B vaccine (1 of 3 - 3-dose series) Never done    HIV screen  Never done    Hepatitis C screen  Never done    Lipids  2019    Diabetes screen  06/10/2023    DTaP/Tdap/Td vaccine (1 - Tdap) 10/09/2024 (Originally 1998)    Flu vaccine (1) 10/09/2024 (Originally 2023)    COVID-19 Vaccine (3 - Pfizer series) 10/09/2028 (Originally 2021)    Depression Screen  10/09/2024    Cervical cancer screen  2026    Hepatitis A vaccine  Aged Out    Hib vaccine  Aged Out    HPV vaccine  Aged Out    Meningococcal (ACWY) vaccine  Aged Out    Pneumococcal 0-64 years Vaccine  Aged Out       Subjective:      Constitutional:Negative for fever, chills, diaphoresis, activity change, appetite change and fatigue.    HENT: Negative for hearing loss, ear pain,

## 2023-10-09 ENCOUNTER — OFFICE VISIT (OUTPATIENT)
Dept: FAMILY MEDICINE CLINIC | Age: 43
End: 2023-10-09
Payer: COMMERCIAL

## 2023-10-09 ENCOUNTER — NURSE ONLY (OUTPATIENT)
Dept: LAB | Age: 43
End: 2023-10-09

## 2023-10-09 VITALS
WEIGHT: 148 LBS | HEART RATE: 96 BPM | BODY MASS INDEX: 26.22 KG/M2 | DIASTOLIC BLOOD PRESSURE: 80 MMHG | RESPIRATION RATE: 18 BRPM | SYSTOLIC BLOOD PRESSURE: 120 MMHG | HEIGHT: 63 IN | TEMPERATURE: 98.4 F

## 2023-10-09 DIAGNOSIS — Z00.00 ROUTINE GENERAL MEDICAL EXAMINATION AT A HEALTH CARE FACILITY: ICD-10-CM

## 2023-10-09 DIAGNOSIS — R55 SYNCOPE, UNSPECIFIED SYNCOPE TYPE: ICD-10-CM

## 2023-10-09 DIAGNOSIS — Z00.00 ROUTINE GENERAL MEDICAL EXAMINATION AT A HEALTH CARE FACILITY: Primary | ICD-10-CM

## 2023-10-09 DIAGNOSIS — R55 NEUROCARDIOGENIC SYNCOPE: ICD-10-CM

## 2023-10-09 DIAGNOSIS — S09.90XA INJURY OF HEAD, INITIAL ENCOUNTER: ICD-10-CM

## 2023-10-09 DIAGNOSIS — I95.1 SYNCOPE DUE TO ORTHOSTATIC HYPOTENSION: ICD-10-CM

## 2023-10-09 DIAGNOSIS — R40.0 DAYTIME SOMNOLENCE: ICD-10-CM

## 2023-10-09 DIAGNOSIS — G47.33 OBSTRUCTIVE SLEEP APNEA ON CPAP: ICD-10-CM

## 2023-10-09 DIAGNOSIS — I95.1 ORTHOSTATIC HYPOTENSION: ICD-10-CM

## 2023-10-09 DIAGNOSIS — Z12.31 ENCOUNTER FOR SCREENING MAMMOGRAM FOR MALIGNANT NEOPLASM OF BREAST: ICD-10-CM

## 2023-10-09 LAB
ALBUMIN SERPL BCG-MCNC: 4.5 G/DL (ref 3.5–5.1)
ALP SERPL-CCNC: 45 U/L (ref 38–126)
ALT SERPL W/O P-5'-P-CCNC: 17 U/L (ref 11–66)
AST SERPL-CCNC: 16 U/L (ref 5–40)
BASOPHILS ABSOLUTE: 0.1 THOU/MM3 (ref 0–0.1)
BASOPHILS NFR BLD AUTO: 1 %
BILIRUB CONJ SERPL-MCNC: < 0.2 MG/DL (ref 0–0.3)
BILIRUB SERPL-MCNC: 0.4 MG/DL (ref 0.3–1.2)
DEPRECATED RDW RBC AUTO: 44.4 FL (ref 35–45)
EOSINOPHIL NFR BLD AUTO: 4.6 %
EOSINOPHILS ABSOLUTE: 0.2 THOU/MM3 (ref 0–0.4)
ERYTHROCYTE [DISTWIDTH] IN BLOOD BY AUTOMATED COUNT: 12.9 % (ref 11.5–14.5)
HCT VFR BLD AUTO: 41.7 % (ref 37–47)
HGB BLD-MCNC: 13.7 GM/DL (ref 12–16)
IMM GRANULOCYTES # BLD AUTO: 0.01 THOU/MM3 (ref 0–0.07)
IMM GRANULOCYTES NFR BLD AUTO: 0.2 %
LYMPHOCYTES ABSOLUTE: 1.7 THOU/MM3 (ref 1–4.8)
LYMPHOCYTES NFR BLD AUTO: 34.1 %
MCH RBC QN AUTO: 30.8 PG (ref 26–33)
MCHC RBC AUTO-ENTMCNC: 32.9 GM/DL (ref 32.2–35.5)
MCV RBC AUTO: 93.7 FL (ref 81–99)
MONOCYTES ABSOLUTE: 0.3 THOU/MM3 (ref 0.4–1.3)
MONOCYTES NFR BLD AUTO: 5.4 %
NEUTROPHILS NFR BLD AUTO: 54.7 %
NRBC BLD AUTO-RTO: 0 /100 WBC
PLATELET # BLD AUTO: 213 THOU/MM3 (ref 130–400)
PMV BLD AUTO: 11.1 FL (ref 9.4–12.4)
PROT SERPL-MCNC: 6.7 G/DL (ref 6.1–8)
RBC # BLD AUTO: 4.45 MILL/MM3 (ref 4.2–5.4)
SEGMENTED NEUTROPHILS ABSOLUTE COUNT: 2.7 THOU/MM3 (ref 1.8–7.7)
WBC # BLD AUTO: 5 THOU/MM3 (ref 4.8–10.8)

## 2023-10-09 PROCEDURE — 99386 PREV VISIT NEW AGE 40-64: CPT | Performed by: FAMILY MEDICINE

## 2023-10-09 RX ORDER — METOPROLOL SUCCINATE 50 MG/1
TABLET, EXTENDED RELEASE ORAL
Qty: 135 TABLET | Refills: 5 | Status: SHIPPED | OUTPATIENT
Start: 2023-10-09

## 2023-10-09 SDOH — ECONOMIC STABILITY: FOOD INSECURITY: WITHIN THE PAST 12 MONTHS, THE FOOD YOU BOUGHT JUST DIDN'T LAST AND YOU DIDN'T HAVE MONEY TO GET MORE.: NEVER TRUE

## 2023-10-09 SDOH — ECONOMIC STABILITY: FOOD INSECURITY: WITHIN THE PAST 12 MONTHS, YOU WORRIED THAT YOUR FOOD WOULD RUN OUT BEFORE YOU GOT MONEY TO BUY MORE.: NEVER TRUE

## 2023-10-09 SDOH — ECONOMIC STABILITY: INCOME INSECURITY: HOW HARD IS IT FOR YOU TO PAY FOR THE VERY BASICS LIKE FOOD, HOUSING, MEDICAL CARE, AND HEATING?: NOT HARD AT ALL

## 2023-10-09 SDOH — ECONOMIC STABILITY: HOUSING INSECURITY
IN THE LAST 12 MONTHS, WAS THERE A TIME WHEN YOU DID NOT HAVE A STEADY PLACE TO SLEEP OR SLEPT IN A SHELTER (INCLUDING NOW)?: NO

## 2023-10-09 ASSESSMENT — PATIENT HEALTH QUESTIONNAIRE - PHQ9
SUM OF ALL RESPONSES TO PHQ9 QUESTIONS 1 & 2: 0
SUM OF ALL RESPONSES TO PHQ QUESTIONS 1-9: 0
2. FEELING DOWN, DEPRESSED OR HOPELESS: 0
1. LITTLE INTEREST OR PLEASURE IN DOING THINGS: 0
SUM OF ALL RESPONSES TO PHQ QUESTIONS 1-9: 0

## 2023-10-10 LAB
ALBUMIN SERPL BCG-MCNC: 4.4 G/DL (ref 3.5–5.1)
ALP SERPL-CCNC: 46 U/L (ref 38–126)
ALT SERPL W/O P-5'-P-CCNC: 17 U/L (ref 11–66)
ANION GAP SERPL CALC-SCNC: 14 MEQ/L (ref 8–16)
AST SERPL-CCNC: 16 U/L (ref 5–40)
BILIRUB SERPL-MCNC: 0.4 MG/DL (ref 0.3–1.2)
BUN SERPL-MCNC: 13 MG/DL (ref 7–22)
CALCIUM SERPL-MCNC: 8.9 MG/DL (ref 8.5–10.5)
CHLORIDE SERPL-SCNC: 103 MEQ/L (ref 98–111)
CHOLEST SERPL-MCNC: 184 MG/DL (ref 100–199)
CO2 SERPL-SCNC: 24 MEQ/L (ref 23–33)
CREAT SERPL-MCNC: 0.9 MG/DL (ref 0.4–1.2)
GFR SERPL CREATININE-BSD FRML MDRD: > 60 ML/MIN/1.73M2
GLUCOSE FASTING: 94 MG/DL (ref 70–108)
HDLC SERPL-MCNC: 45 MG/DL
LDLC SERPL CALC-MCNC: 123 MG/DL
POTASSIUM SERPL-SCNC: 4 MEQ/L (ref 3.5–5.2)
PROT SERPL-MCNC: 6.6 G/DL (ref 6.1–8)
SODIUM SERPL-SCNC: 141 MEQ/L (ref 135–145)
TRIGL SERPL-MCNC: 80 MG/DL (ref 0–199)
TSH SERPL DL<=0.005 MIU/L-ACNC: 1.5 UIU/ML (ref 0.4–4.2)

## 2023-10-10 NOTE — RESULT ENCOUNTER NOTE
Labs look good  No changes to med  LDL elevated , healthy diet and exercise  Consider crestor  Call patient

## 2023-10-12 LAB — LAMOTRIGINE SERPL-MCNC: 12.2 UG/ML (ref 3–15)

## 2024-01-15 ENCOUNTER — APPOINTMENT (OUTPATIENT)
Dept: WOMENS IMAGING | Age: 44
End: 2024-01-15
Attending: FAMILY MEDICINE
Payer: COMMERCIAL

## 2024-01-15 ENCOUNTER — HOSPITAL ENCOUNTER (OUTPATIENT)
Dept: WOMENS IMAGING | Age: 44
Discharge: HOME | End: 2024-01-15
Attending: FAMILY MEDICINE
Payer: COMMERCIAL

## 2024-01-15 VITALS — WEIGHT: 148 LBS | HEIGHT: 64 IN | BODY MASS INDEX: 25.27 KG/M2

## 2024-01-15 DIAGNOSIS — Z12.31 ENCOUNTER FOR SCREENING MAMMOGRAM FOR MALIGNANT NEOPLASM OF BREAST: ICD-10-CM

## 2024-01-15 PROCEDURE — 77063 BREAST TOMOSYNTHESIS BI: CPT

## 2024-03-19 DIAGNOSIS — R56.9 SEIZURE-LIKE ACTIVITY (HCC): ICD-10-CM

## 2024-03-19 DIAGNOSIS — I95.1 SYNCOPE DUE TO ORTHOSTATIC HYPOTENSION: ICD-10-CM

## 2024-03-19 RX ORDER — LAMOTRIGINE 100 MG/1
TABLET ORAL
Qty: 180 TABLET | Refills: 0 | Status: SHIPPED | OUTPATIENT
Start: 2024-03-19

## 2024-03-19 NOTE — TELEPHONE ENCOUNTER
Ninfa Ken called requesting a refill on the following medications:  Requested Prescriptions     Pending Prescriptions Disp Refills    lamoTRIgine (LAMICTAL) 100 MG tablet [Pharmacy Med Name: lamoTRIgine 100 MG Oral Tablet] 180 tablet 0     Sig: TAKE 3 TABLETS BY MOUTH TWICE DAILY       Date of last visit: 5/8/2023- Paige Leopold, CNP  Date of next visit (if applicable):5/6/2024- Dr. Tidwell  Date of last refill: 5/8/23  Pharmacy Name: Amanda Dallas LPN

## 2024-03-25 DIAGNOSIS — I95.1 SYNCOPE DUE TO ORTHOSTATIC HYPOTENSION: ICD-10-CM

## 2024-03-25 DIAGNOSIS — R56.9 SEIZURE-LIKE ACTIVITY (HCC): ICD-10-CM

## 2024-03-26 RX ORDER — LAMOTRIGINE 100 MG/1
TABLET ORAL
Qty: 180 TABLET | Refills: 0 | OUTPATIENT
Start: 2024-03-26

## 2024-04-14 NOTE — PROGRESS NOTES
[facility]  [facility]  [address]  [address]  [facility] [address]  Dept: [phone]  Dept Fax: [phone]  Loc: [phone]    Ninfa Ken is a 43 y.o. female who presents today for:  Chief Complaint   Patient presents with    6 Month Follow-Up       HPI:     HPI  Well Adult Physical: Patient here for a comprehensive physical exam.The patient reports problems - see below  Do you take any herbs or supplements that were not prescribed by a doctor? no Are you taking calcium supplements? no Are you taking aspirin daily? no   History:  LMP: No LMP recorded. Patient is premenopausal.  Last pap date: 23  Abnormal pap? no  : 2  Para: 2  Any STD's in the past? none    She has a history of episodes of dizziness and passing out.  The cardiologist is not convinced that it is cardiac in origin.  She is still seeing neurology.  Taking lamictal ad toprol. Doing well    SANTANA should be using the CPAP but she refuses.       Reviewed chart forpast medical history , surgical history , allergies, social history , family history and medications.    Health Maintenance   Topic Date Due    Hepatitis B vaccine (1 of 3 - 3-dose series) Never done    HIV screen  Never done    Hepatitis C screen  Never done    DTaP/Tdap/Td vaccine (1 - Tdap) 10/09/2024 (Originally 1998)    Flu vaccine (Season Ended) 10/09/2024 (Originally 2024)    COVID-19 Vaccine (3 - - season) 10/09/2028 (Originally 2023)    Depression Screen  10/09/2024    Cervical cancer screen  2026    Diabetes screen  10/09/2026    Lipids  10/09/2028    Hepatitis A vaccine  Aged Out    Hib vaccine  Aged Out    HPV vaccine  Aged Out    Polio vaccine  Aged Out    Meningococcal (ACWY) vaccine  Aged Out    Pneumococcal 0-64 years Vaccine  Aged Out       Subjective:      Constitutional:Negative for fever, chills, diaphoresis, activity change, appetite change and fatigue.   HENT:

## 2024-04-15 ENCOUNTER — OFFICE VISIT (OUTPATIENT)
Dept: FAMILY MEDICINE CLINIC | Age: 44
End: 2024-04-15
Payer: COMMERCIAL

## 2024-04-15 VITALS
RESPIRATION RATE: 18 BRPM | OXYGEN SATURATION: 98 % | TEMPERATURE: 97 F | DIASTOLIC BLOOD PRESSURE: 72 MMHG | HEART RATE: 72 BPM | BODY MASS INDEX: 25.88 KG/M2 | SYSTOLIC BLOOD PRESSURE: 114 MMHG | WEIGHT: 150.79 LBS

## 2024-04-15 DIAGNOSIS — R55 NEUROCARDIOGENIC SYNCOPE: ICD-10-CM

## 2024-04-15 DIAGNOSIS — R40.0 DAYTIME SOMNOLENCE: ICD-10-CM

## 2024-04-15 DIAGNOSIS — G47.33 OBSTRUCTIVE SLEEP APNEA ON CPAP: Primary | ICD-10-CM

## 2024-04-15 DIAGNOSIS — I95.1 ORTHOSTATIC HYPOTENSION: ICD-10-CM

## 2024-04-15 DIAGNOSIS — Z12.11 SCREEN FOR COLON CANCER: ICD-10-CM

## 2024-04-15 DIAGNOSIS — Z00.00 ROUTINE GENERAL MEDICAL EXAMINATION AT A HEALTH CARE FACILITY: ICD-10-CM

## 2024-04-15 PROCEDURE — 99214 OFFICE O/P EST MOD 30 MIN: CPT | Performed by: FAMILY MEDICINE

## 2024-04-15 ASSESSMENT — PATIENT HEALTH QUESTIONNAIRE - PHQ9
1. LITTLE INTEREST OR PLEASURE IN DOING THINGS: NOT AT ALL
SUM OF ALL RESPONSES TO PHQ QUESTIONS 1-9: 0
SUM OF ALL RESPONSES TO PHQ QUESTIONS 1-9: 0
2. FEELING DOWN, DEPRESSED OR HOPELESS: NOT AT ALL
SUM OF ALL RESPONSES TO PHQ QUESTIONS 1-9: 0
SUM OF ALL RESPONSES TO PHQ9 QUESTIONS 1 & 2: 0
SUM OF ALL RESPONSES TO PHQ QUESTIONS 1-9: 0

## 2024-04-21 DIAGNOSIS — R56.9 SEIZURE-LIKE ACTIVITY (HCC): ICD-10-CM

## 2024-04-21 DIAGNOSIS — I95.1 SYNCOPE DUE TO ORTHOSTATIC HYPOTENSION: ICD-10-CM

## 2024-04-22 RX ORDER — LAMOTRIGINE 100 MG/1
TABLET ORAL
Qty: 180 TABLET | Refills: 0 | Status: SHIPPED | OUTPATIENT
Start: 2024-04-22

## 2024-04-22 NOTE — TELEPHONE ENCOUNTER
Ninfa Ken called requesting a refill on the following medications:  Requested Prescriptions     Pending Prescriptions Disp Refills    lamoTRIgine (LAMICTAL) 100 MG tablet [Pharmacy Med Name: lamoTRIgine 100 MG Oral Tablet] 180 tablet 0     Sig: TAKE 3 TABLETS BY MOUTH TWICE DAILY       Date of last visit: 5/8/2023- Paige Leopold, CNP  Date of next visit (if applicable):6/10/24- Dr. Tidwell  Date of last refill: 3/19/24  Pharmacy Name: Amanda Pagan LPN

## 2024-05-19 DIAGNOSIS — R56.9 SEIZURE-LIKE ACTIVITY (HCC): ICD-10-CM

## 2024-05-19 DIAGNOSIS — I95.1 SYNCOPE DUE TO ORTHOSTATIC HYPOTENSION: ICD-10-CM

## 2024-05-21 RX ORDER — LAMOTRIGINE 100 MG/1
TABLET ORAL
Qty: 180 TABLET | Refills: 3 | Status: SHIPPED | OUTPATIENT
Start: 2024-05-21

## 2024-05-21 NOTE — TELEPHONE ENCOUNTER
Ninfa Ken called requesting a refill on the following medications:  Requested Prescriptions     Pending Prescriptions Disp Refills    lamoTRIgine (LAMICTAL) 100 MG tablet [Pharmacy Med Name: lamoTRIgine 100 MG Oral Tablet] 180 tablet 0     Sig: TAKE 3 TABLETS BY MOUTH TWICE DAILY       Date of last visit: 5/8/2023- Paige Leopold, CNP  Date of next visit (if applicable):8/2/24- Dr. Tidwell  Date of last refill: 4/22/24  Pharmacy Name: Amanda Duran LPN

## 2024-08-02 ENCOUNTER — OFFICE VISIT (OUTPATIENT)
Dept: NEUROLOGY | Age: 44
End: 2024-08-02
Payer: COMMERCIAL

## 2024-08-02 VITALS
SYSTOLIC BLOOD PRESSURE: 122 MMHG | OXYGEN SATURATION: 99 % | WEIGHT: 150 LBS | HEIGHT: 64 IN | BODY MASS INDEX: 25.61 KG/M2 | DIASTOLIC BLOOD PRESSURE: 76 MMHG | HEART RATE: 81 BPM

## 2024-08-02 DIAGNOSIS — I95.1 SYNCOPE DUE TO ORTHOSTATIC HYPOTENSION: ICD-10-CM

## 2024-08-02 DIAGNOSIS — R56.9 SEIZURE-LIKE ACTIVITY (HCC): Primary | ICD-10-CM

## 2024-08-02 PROCEDURE — 99213 OFFICE O/P EST LOW 20 MIN: CPT | Performed by: PSYCHIATRY & NEUROLOGY

## 2024-08-02 RX ORDER — FOLIC ACID 1 MG/1
1 TABLET ORAL DAILY
Qty: 90 TABLET | Refills: 3 | Status: SHIPPED | OUTPATIENT
Start: 2024-08-02

## 2024-08-02 NOTE — PATIENT INSTRUCTIONS
Continue with Lamictal 300 mg twice a day. Refills given.   Ordered Lamictal level. CBC and hepatic panel   Take Folic acid 1 mg daily while on Lamictal.   Follow up in 12 months or sooner if needed.   Report any new symptoms.

## 2024-08-02 NOTE — PROGRESS NOTES
Follow up for seizure like activity, syncope.  She is doing well, she denies any seizure like activity or passing out. Her BP is running well. She is on Lamictal 300 mg twice a day. Most recent available blood work is from 10/85253. She is pleased with how she is doing. After detailed discussion with patient we agreed on the following plan.         Plan:  Continue with Lamictal 300 mg twice a day. Refills given.   Ordered Lamictal level. CBC and hepatic panel   Take Folic acid 1 mg daily while on Lamictal.   Follow up in 12 months or sooner if needed.   Report any new symptoms.          Total time 23 min    Vikas Tidwell MD

## 2024-08-05 ENCOUNTER — LAB (OUTPATIENT)
Dept: LAB | Age: 44
End: 2024-08-05

## 2024-08-05 DIAGNOSIS — I95.1 SYNCOPE DUE TO ORTHOSTATIC HYPOTENSION: ICD-10-CM

## 2024-08-05 DIAGNOSIS — R56.9 SEIZURE-LIKE ACTIVITY (HCC): ICD-10-CM

## 2024-08-05 LAB
ALBUMIN SERPL BCG-MCNC: 4.3 G/DL (ref 3.5–5.1)
ALP SERPL-CCNC: 49 U/L (ref 38–126)
ALT SERPL W/O P-5'-P-CCNC: 13 U/L (ref 11–66)
ANION GAP SERPL CALC-SCNC: 12 MEQ/L (ref 8–16)
AST SERPL-CCNC: 12 U/L (ref 5–40)
BASOPHILS ABSOLUTE: 0 THOU/MM3 (ref 0–0.1)
BASOPHILS NFR BLD AUTO: 0.7 %
BILIRUB SERPL-MCNC: 0.3 MG/DL (ref 0.3–1.2)
BUN SERPL-MCNC: 13 MG/DL (ref 7–22)
CALCIUM SERPL-MCNC: 9.2 MG/DL (ref 8.5–10.5)
CHLORIDE SERPL-SCNC: 105 MEQ/L (ref 98–111)
CO2 SERPL-SCNC: 23 MEQ/L (ref 23–33)
CREAT SERPL-MCNC: 1 MG/DL (ref 0.4–1.2)
DEPRECATED RDW RBC AUTO: 42.5 FL (ref 35–45)
EOSINOPHIL NFR BLD AUTO: 5.6 %
EOSINOPHILS ABSOLUTE: 0.3 THOU/MM3 (ref 0–0.4)
ERYTHROCYTE [DISTWIDTH] IN BLOOD BY AUTOMATED COUNT: 12.7 % (ref 11.5–14.5)
GFR SERPL CREATININE-BSD FRML MDRD: 71 ML/MIN/1.73M2
GLUCOSE SERPL-MCNC: 96 MG/DL (ref 70–108)
HCT VFR BLD AUTO: 40.9 % (ref 37–47)
HGB BLD-MCNC: 13.6 GM/DL (ref 12–16)
IMM GRANULOCYTES # BLD AUTO: 0.01 THOU/MM3 (ref 0–0.07)
IMM GRANULOCYTES NFR BLD AUTO: 0.2 %
LYMPHOCYTES ABSOLUTE: 2 THOU/MM3 (ref 1–4.8)
LYMPHOCYTES NFR BLD AUTO: 32.2 %
MCH RBC QN AUTO: 30.5 PG (ref 26–33)
MCHC RBC AUTO-ENTMCNC: 33.3 GM/DL (ref 32.2–35.5)
MCV RBC AUTO: 91.7 FL (ref 81–99)
MONOCYTES ABSOLUTE: 0.3 THOU/MM3 (ref 0.4–1.3)
MONOCYTES NFR BLD AUTO: 5.2 %
NEUTROPHILS ABSOLUTE: 3.4 THOU/MM3 (ref 1.8–7.7)
NEUTROPHILS NFR BLD AUTO: 56.1 %
NRBC BLD AUTO-RTO: 0 /100 WBC
PLATELET # BLD AUTO: 242 THOU/MM3 (ref 130–400)
PMV BLD AUTO: 11.1 FL (ref 9.4–12.4)
POTASSIUM SERPL-SCNC: 4.6 MEQ/L (ref 3.5–5.2)
PROT SERPL-MCNC: 6.8 G/DL (ref 6.1–8)
RBC # BLD AUTO: 4.46 MILL/MM3 (ref 4.2–5.4)
SODIUM SERPL-SCNC: 140 MEQ/L (ref 135–145)
WBC # BLD AUTO: 6.1 THOU/MM3 (ref 4.8–10.8)

## 2024-08-06 LAB — LAMOTRIGINE SERPL-MCNC: 13.1 UG/ML (ref 3–15)

## 2024-09-06 DIAGNOSIS — R56.9 SEIZURE-LIKE ACTIVITY (HCC): ICD-10-CM

## 2024-09-06 DIAGNOSIS — I95.1 SYNCOPE DUE TO ORTHOSTATIC HYPOTENSION: ICD-10-CM

## 2024-09-06 RX ORDER — LAMOTRIGINE 100 MG/1
TABLET ORAL
Qty: 180 TABLET | Refills: 3 | Status: SHIPPED | OUTPATIENT
Start: 2024-09-06

## 2024-09-06 NOTE — TELEPHONE ENCOUNTER
Ninfa Ken called requesting a refill on the following medications:  Requested Prescriptions     Pending Prescriptions Disp Refills    lamoTRIgine (LAMICTAL) 100 MG tablet [Pharmacy Med Name: lamoTRIgine 100 MG Oral Tablet] 180 tablet 0     Sig: TAKE 3 TABLETS BY MOUTH TWICE DAILY       Date of last visit: 8/2/2024- Dr. Tidwell  Date of next visit (if applicable):8/4/2025- Paige Leopold, CNP  Date of last refill: 5/21/24  Pharmacy Name: Amanda Duran LPN

## 2024-10-11 SDOH — ECONOMIC STABILITY: INCOME INSECURITY: HOW HARD IS IT FOR YOU TO PAY FOR THE VERY BASICS LIKE FOOD, HOUSING, MEDICAL CARE, AND HEATING?: NOT HARD AT ALL

## 2024-10-11 SDOH — ECONOMIC STABILITY: TRANSPORTATION INSECURITY
IN THE PAST 12 MONTHS, HAS LACK OF TRANSPORTATION KEPT YOU FROM MEETINGS, WORK, OR FROM GETTING THINGS NEEDED FOR DAILY LIVING?: NO

## 2024-10-11 SDOH — ECONOMIC STABILITY: FOOD INSECURITY: WITHIN THE PAST 12 MONTHS, YOU WORRIED THAT YOUR FOOD WOULD RUN OUT BEFORE YOU GOT MONEY TO BUY MORE.: NEVER TRUE

## 2024-10-11 SDOH — ECONOMIC STABILITY: FOOD INSECURITY: WITHIN THE PAST 12 MONTHS, THE FOOD YOU BOUGHT JUST DIDN'T LAST AND YOU DIDN'T HAVE MONEY TO GET MORE.: NEVER TRUE

## 2024-10-12 NOTE — PROGRESS NOTES
[facility]  [facility] MEDICINE  [address]  [address]  [facility] [address]  Dept: [phone]  Dept Fax: [phone]  Loc: [phone]    Ninfa Ken is a 44 y.o. female who presents today for:  Chief Complaint   Patient presents with    6 Month Follow-Up       HPI:     HPI  Well Adult Physical: Patient here for a comprehensive physical exam.The patient reports problems - see below  Do you take any herbs or supplements that were not prescribed by a doctor? no Are you taking calcium supplements? no Are you taking aspirin daily? no   History:  LMP: No LMP recorded. Patient is premenopausal.  Last pap date: 23  Abnormal pap? no  : 2  Para: 2  Any STD's in the past? none    She has a history of episodes of dizziness and passing out.  The cardiologist is not convinced that it is cardiac in origin.  She is still seeing neurology.  Taking lamictal ad toprol. Doing well.     SANTANA should be using the CPAP but she refuses.        Reviewed chart forpast medical history , surgical history , allergies, social history , family history and medications.    Health Maintenance   Topic Date Due    HIV screen  Never done    Hepatitis C screen  Never done    Hepatitis B vaccine (1 of 3 - 19+ 3-dose series) Never done    DTaP/Tdap/Td vaccine (1 - Tdap) Never done    Colorectal Cancer Screen  Never done    Flu vaccine (1) 10/14/2025 (Originally 2024)    COVID-19 Vaccine (3 - -24 season) 10/14/2029 (Originally 2024)    Depression Screen  04/15/2025    Breast cancer screen  01/15/2026    Cervical cancer screen  2026    Lipids  10/09/2028    Hepatitis A vaccine  Aged Out    Hib vaccine  Aged Out    HPV vaccine  Aged Out    Polio vaccine  Aged Out    Meningococcal (ACWY) vaccine  Aged Out    Pneumococcal 0-64 years Vaccine  Aged Out    Diabetes screen  Discontinued       Subjective:      Constitutional:Negative for fever, chills, diaphoresis, activity

## 2024-10-14 ENCOUNTER — OFFICE VISIT (OUTPATIENT)
Dept: FAMILY MEDICINE CLINIC | Age: 44
End: 2024-10-14
Payer: COMMERCIAL

## 2024-10-14 VITALS
WEIGHT: 151.46 LBS | HEIGHT: 64 IN | SYSTOLIC BLOOD PRESSURE: 124 MMHG | HEART RATE: 84 BPM | BODY MASS INDEX: 25.86 KG/M2 | RESPIRATION RATE: 16 BRPM | TEMPERATURE: 97.1 F | OXYGEN SATURATION: 100 % | DIASTOLIC BLOOD PRESSURE: 86 MMHG

## 2024-10-14 DIAGNOSIS — R55 NEUROCARDIOGENIC SYNCOPE: ICD-10-CM

## 2024-10-14 DIAGNOSIS — Z00.00 ROUTINE GENERAL MEDICAL EXAMINATION AT A HEALTH CARE FACILITY: Primary | ICD-10-CM

## 2024-10-14 DIAGNOSIS — I95.1 ORTHOSTATIC HYPOTENSION: ICD-10-CM

## 2024-10-14 DIAGNOSIS — G47.33 OBSTRUCTIVE SLEEP APNEA ON CPAP: ICD-10-CM

## 2024-10-14 DIAGNOSIS — R40.0 DAYTIME SOMNOLENCE: ICD-10-CM

## 2024-10-14 PROCEDURE — 99396 PREV VISIT EST AGE 40-64: CPT | Performed by: FAMILY MEDICINE

## 2024-10-14 RX ORDER — METOPROLOL SUCCINATE 50 MG/1
TABLET, EXTENDED RELEASE ORAL
Qty: 135 TABLET | Refills: 5 | Status: SHIPPED | OUTPATIENT
Start: 2024-10-14

## 2025-01-13 DIAGNOSIS — R56.9 SEIZURE-LIKE ACTIVITY (HCC): ICD-10-CM

## 2025-01-13 DIAGNOSIS — I95.1 SYNCOPE DUE TO ORTHOSTATIC HYPOTENSION: ICD-10-CM

## 2025-01-14 RX ORDER — LAMOTRIGINE 100 MG/1
TABLET ORAL
Qty: 180 TABLET | Refills: 3 | Status: SHIPPED | OUTPATIENT
Start: 2025-01-14

## 2025-01-14 NOTE — TELEPHONE ENCOUNTER
Ninfa Ken called requesting a refill on the following medications:  Requested Prescriptions     Pending Prescriptions Disp Refills    lamoTRIgine (LAMICTAL) 100 MG tablet [Pharmacy Med Name: lamoTRIgine 100 MG Oral Tablet] 180 tablet 0     Sig: TAKE 3 TABLETS BY MOUTH TWICE DAILY       Date of last visit: 8/2/2024  Date of next visit (if applicable):8/4/25  Date of last refill: 9/6/24  Pharmacy Name: Amanda Duran LPN

## 2025-03-18 NOTE — PROGRESS NOTES
Sedation Plan    ASA 3     Mallampati class: II.    Risks, benefits, and alternatives discussed with patient.       and dry. Patient is not diaphoretic. Psychiatric: Patient  has a normal mood and affect. Diagnostic Data:    Sleep test: 2014  PATIENT NAME: Sagrario Dominguez       :  1980  MEDICAL RECORD NO. [id number]               ROOM:   ACCOUNT NO: [de-identified]                        DATE: 2014  PHYSICIAN: Shira Acosta M.D.  [facility]:  Dr. Raina Pedro.     RESPIRATORY EVENT ANALYSIS:  Revealed the patient had a total of 4 apneas and  52 hypopneas.  Out of 4 apneas, all of them are found to be obstructive in  nature.  The total number of apneas and hypopneas recorded during study were  56 with an apnea-hypopnea index of 13.5.  The patient had a total of 87  respiratory events with a respiratory disturbance index of 33.2.  The  patient's REM sleep apnea-hypopnea was 0.     IMPRESSION:    1.    Mild obstructive sleep apnea with worsening of respiratory events in          supine position.    2.    Decreased and delayed REM sleep.    3.    Periodic limb movements with no significant arousals.    4.    Hypersomnia.    5.    History of seizures.  She is currently on treatment with Lamictal.     EEG:                  ELECTROENCEPHALOGRAM REPORT     PATIENT NAME: Alireza Arellano               :        1980  MED REC NO:   [id number]                           ROOM:  ACCOUNT NO:   [id number]                           ADMIT DATE: 2020  PROVIDER: Elisa Frances.  Kiya Montilla MD     DATE OF EE2020     REFERRING PROVIDER: Deric Gray CNP  IMPRESSION:  This is an abnormal EEG due to presence of left frontal  sharp waves and spike and wave activity occasionally during the  recording that may be epileptogenic in nature, indicates seizure  tendency in the proper clinical context.  This is also consistent with  the patient's known history of seizure disorder.  No clinical seizures  were observed. Cristelaa Sol johanne is recommended.     Diagnostic Data: 6/24/2014 AHI: 5.6  PAP Download:   Recorded compliance dates: 8/24/2020-9/22/2020  More than 4hour usage compliance was:73.3%. Average residual Apnea- Hypoapnea index on current pressue was:1.8.       PAP Type CPAP    Level  7.0      Average usuage hours per day was: 6 hr 38 min 18 sec        Interface: Nasal pillows     Provider:  [x]? [facility]             []?[facility]                        []?[facility]          []? [employer]                           []?[facility]      []? Other:       Assesment:  -Mild obstructive sleep apnea on treatment with a CPAP pressure of 7cm H20- she is using her CPAP device with excellent compliance to > hours of therapy. Her respiratory events were under good control with current therapy. Her clinical symptoms improved. She is benefiting from current CPAP therapy and would like to continue the therapy. However her current CPAP machine became old, obsolete and making a loud humming noise. She needs a replacement with a new one. She not able to sleep well at night time due to loud noise from her CPAP device  -Decreased and delayed REM sleep.  -Periodic limb movements with no significant arousals.  -Hx of Hypersomnia due to SANTANA-improved   -History of seizures. She is currently on treatment with Lamictal.  -Hx of Elevated EBV Ig G titres in the blood. Recommendations/Plan:  -At the request of patient, she was given a prescription for a new CPAP machine with current pressure of 7cm H20. The new CPAP machine must have capabilities to give downloads regarding her residual AHI and >4hour compliance. -She was advised to continue current positive airway pressure therapy with above described pressure.  -She was advised to keep good compliance with current recommended pressure to get optimal results and clinical improvement.  -She was instructed to extend her sleep schedule to 7 to 9 hours in a given 24hour period continuously.   -Follow with my clinic in 12months for clinical reevaluation with review of download. -She was advised to call [employer] company regarding supplies if needed.  -She was advised to call my office for earlier appointment if needed for worsening of sleep symptoms.  -Alexia Nelson was educated about my impression and plan. Patient verbalizes understanding.

## 2025-04-15 PROBLEM — R56.9 SEIZURE-LIKE ACTIVITY (HCC): Status: ACTIVE | Noted: 2025-04-15

## 2025-04-16 NOTE — PROGRESS NOTES
[facility]  [facility]  [address]  [address]  [facility] [address]  Dept: [phone]  Dept Fax: [phone]  Loc: [phone]    Ninfa Ken is a 44 y.o. female who presents today for:  Chief Complaint   Patient presents with    6 Month Follow-Up     SANTANA, Hypotension       HPI:     HPI  Well Adult Physical: Patient here for a comprehensive physical exam.The patient reports problems - see below  Do you take any herbs or supplements that were not prescribed by a doctor? no Are you taking calcium supplements? no Are you taking aspirin daily? no   History:  LMP: No LMP recorded. (Menstrual status: Not having periods).  Last pap date: 23  Abnormal pap? no  : 2  Para: 2  Any STD's in the past? none    She has a history of episodes of dizziness and passing out.  The cardiologist is not convinced that it is cardiac in origin.  She is still seeing neurology.  Taking lamictal and toprol. Doing well.     SANTANA should be using the CPAP but she refuses.        Reviewed chart forpast medical history , surgical history , allergies, social history , family history and medications.    Health Maintenance   Topic Date Due    HIV screen  Never done    Hepatitis C screen  Never done    Hepatitis B vaccine (1 of 3 - 19+ 3-dose series) Never done    Colorectal Cancer Screen  Never done    Flu vaccine (Season Ended) 10/14/2025 (Originally 2025)    DTaP/Tdap/Td vaccine (1 - Tdap) 2026 (Originally 1998)    COVID-19 Vaccine (3 - - season) 10/14/2029 (Originally 2024)    Breast cancer screen  01/15/2026    Cervical cancer screen  2026    Depression Screen  2026    Lipids  2030    Hepatitis A vaccine  Aged Out    Hib vaccine  Aged Out    HPV vaccine  Aged Out    Polio vaccine  Aged Out    Meningococcal (ACWY) vaccine  Aged Out    Meningococcal B vaccine  Aged Out    Pneumococcal 0-49 years Vaccine  Aged Out    Diabetes

## 2025-04-20 SDOH — ECONOMIC STABILITY: FOOD INSECURITY: WITHIN THE PAST 12 MONTHS, YOU WORRIED THAT YOUR FOOD WOULD RUN OUT BEFORE YOU GOT MONEY TO BUY MORE.: NEVER TRUE

## 2025-04-20 SDOH — ECONOMIC STABILITY: FOOD INSECURITY: WITHIN THE PAST 12 MONTHS, THE FOOD YOU BOUGHT JUST DIDN'T LAST AND YOU DIDN'T HAVE MONEY TO GET MORE.: NEVER TRUE

## 2025-04-20 SDOH — ECONOMIC STABILITY: INCOME INSECURITY: IN THE LAST 12 MONTHS, WAS THERE A TIME WHEN YOU WERE NOT ABLE TO PAY THE MORTGAGE OR RENT ON TIME?: NO

## 2025-04-20 SDOH — ECONOMIC STABILITY: TRANSPORTATION INSECURITY
IN THE PAST 12 MONTHS, HAS THE LACK OF TRANSPORTATION KEPT YOU FROM MEDICAL APPOINTMENTS OR FROM GETTING MEDICATIONS?: NO

## 2025-04-20 ASSESSMENT — PATIENT HEALTH QUESTIONNAIRE - PHQ9
1. LITTLE INTEREST OR PLEASURE IN DOING THINGS: NOT AT ALL
SUM OF ALL RESPONSES TO PHQ QUESTIONS 1-9: 0
1. LITTLE INTEREST OR PLEASURE IN DOING THINGS: NOT AT ALL
SUM OF ALL RESPONSES TO PHQ9 QUESTIONS 1 & 2: 0
SUM OF ALL RESPONSES TO PHQ QUESTIONS 1-9: 0
2. FEELING DOWN, DEPRESSED OR HOPELESS: NOT AT ALL
SUM OF ALL RESPONSES TO PHQ QUESTIONS 1-9: 0
SUM OF ALL RESPONSES TO PHQ QUESTIONS 1-9: 0
2. FEELING DOWN, DEPRESSED OR HOPELESS: NOT AT ALL

## 2025-04-21 ENCOUNTER — OFFICE VISIT (OUTPATIENT)
Dept: FAMILY MEDICINE CLINIC | Age: 45
End: 2025-04-21
Payer: COMMERCIAL

## 2025-04-21 ENCOUNTER — RESULTS FOLLOW-UP (OUTPATIENT)
Dept: FAMILY MEDICINE CLINIC | Age: 45
End: 2025-04-21

## 2025-04-21 ENCOUNTER — LAB (OUTPATIENT)
Dept: LAB | Age: 45
End: 2025-04-21

## 2025-04-21 VITALS
SYSTOLIC BLOOD PRESSURE: 110 MMHG | RESPIRATION RATE: 16 BRPM | HEIGHT: 64 IN | DIASTOLIC BLOOD PRESSURE: 72 MMHG | HEART RATE: 78 BPM | BODY MASS INDEX: 26.72 KG/M2 | OXYGEN SATURATION: 97 % | WEIGHT: 156.53 LBS | TEMPERATURE: 97.8 F

## 2025-04-21 DIAGNOSIS — R40.0 DAYTIME SOMNOLENCE: ICD-10-CM

## 2025-04-21 DIAGNOSIS — E78.00 PURE HYPERCHOLESTEROLEMIA: Primary | ICD-10-CM

## 2025-04-21 DIAGNOSIS — R56.9 SEIZURE-LIKE ACTIVITY (HCC): ICD-10-CM

## 2025-04-21 DIAGNOSIS — I95.1 SYNCOPE DUE TO ORTHOSTATIC HYPOTENSION: ICD-10-CM

## 2025-04-21 DIAGNOSIS — I95.1 ORTHOSTATIC HYPOTENSION: Primary | ICD-10-CM

## 2025-04-21 DIAGNOSIS — R55 NEUROCARDIOGENIC SYNCOPE: ICD-10-CM

## 2025-04-21 DIAGNOSIS — S09.90XA INJURY OF HEAD, INITIAL ENCOUNTER: ICD-10-CM

## 2025-04-21 DIAGNOSIS — Z00.00 ROUTINE GENERAL MEDICAL EXAMINATION AT A HEALTH CARE FACILITY: ICD-10-CM

## 2025-04-21 DIAGNOSIS — Z12.11 COLON CANCER SCREENING: ICD-10-CM

## 2025-04-21 DIAGNOSIS — G47.33 OBSTRUCTIVE SLEEP APNEA ON CPAP: ICD-10-CM

## 2025-04-21 LAB
ALBUMIN SERPL BCG-MCNC: 4.1 G/DL (ref 3.4–4.9)
ALP SERPL-CCNC: 49 U/L (ref 38–126)
ALT SERPL W/O P-5'-P-CCNC: 15 U/L (ref 10–35)
ANION GAP SERPL CALC-SCNC: 9 MEQ/L (ref 8–16)
AST SERPL-CCNC: 15 U/L (ref 10–35)
BILIRUB SERPL-MCNC: 0.3 MG/DL (ref 0.3–1.2)
BUN SERPL-MCNC: 13 MG/DL (ref 8–23)
CALCIUM SERPL-MCNC: 9 MG/DL (ref 8.6–10)
CHLORIDE SERPL-SCNC: 105 MEQ/L (ref 98–111)
CHOLEST SERPL-MCNC: 189 MG/DL (ref 100–199)
CO2 SERPL-SCNC: 25 MEQ/L (ref 22–29)
CREAT SERPL-MCNC: 1 MG/DL (ref 0.5–0.9)
DEPRECATED RDW RBC AUTO: 42.1 FL (ref 35–45)
ERYTHROCYTE [DISTWIDTH] IN BLOOD BY AUTOMATED COUNT: 12.8 % (ref 11.5–14.5)
GFR SERPL CREATININE-BSD FRML MDRD: 70 ML/MIN/1.73M2
GLUCOSE FASTING: 100 MG/DL (ref 74–109)
HCT VFR BLD AUTO: 40.7 % (ref 37–47)
HDLC SERPL-MCNC: 42 MG/DL
HGB BLD-MCNC: 13.5 GM/DL (ref 12–16)
LDLC SERPL CALC-MCNC: 123 MG/DL
MCH RBC QN AUTO: 30.1 PG (ref 26–33)
MCHC RBC AUTO-ENTMCNC: 33.2 GM/DL (ref 32.2–35.5)
MCV RBC AUTO: 90.6 FL (ref 81–99)
PLATELET # BLD AUTO: 215 THOU/MM3 (ref 130–400)
PMV BLD AUTO: 11.4 FL (ref 9.4–12.4)
POTASSIUM SERPL-SCNC: 4.6 MEQ/L (ref 3.5–5.2)
PROT SERPL-MCNC: 6.6 G/DL (ref 6.4–8.3)
RBC # BLD AUTO: 4.49 MILL/MM3 (ref 4.2–5.4)
SODIUM SERPL-SCNC: 139 MEQ/L (ref 135–145)
TRIGL SERPL-MCNC: 121 MG/DL (ref 0–199)
TSH SERPL DL<=0.05 MIU/L-ACNC: 2.05 UIU/ML (ref 0.27–4.2)
WBC # BLD AUTO: 5.4 THOU/MM3 (ref 4.8–10.8)

## 2025-04-21 PROCEDURE — 99214 OFFICE O/P EST MOD 30 MIN: CPT | Performed by: FAMILY MEDICINE

## 2025-04-21 RX ORDER — ROSUVASTATIN CALCIUM 20 MG/1
20 TABLET, COATED ORAL NIGHTLY
Qty: 30 TABLET | Refills: 3 | Status: SHIPPED | OUTPATIENT
Start: 2025-04-21

## 2025-04-21 NOTE — RESULT ENCOUNTER NOTE
LDL elevated   Healthy diet and exercise 3-5 days per week  Consider crestor    Lamictal pending    Call patient

## 2025-04-23 LAB — LAMOTRIGINE SERPL-MCNC: 12.6 UG/ML (ref 3–15)

## 2025-05-14 DIAGNOSIS — R56.9 SEIZURE-LIKE ACTIVITY (HCC): ICD-10-CM

## 2025-05-14 DIAGNOSIS — I95.1 SYNCOPE DUE TO ORTHOSTATIC HYPOTENSION: ICD-10-CM

## 2025-05-14 RX ORDER — LAMOTRIGINE 100 MG/1
TABLET ORAL
Qty: 180 TABLET | Refills: 0 | Status: SHIPPED | OUTPATIENT
Start: 2025-05-14

## 2025-05-14 NOTE — TELEPHONE ENCOUNTER
Please approve or deny     Last Visit Date:  8/2/2024  Dr. Tidwell      Next Visit Date:    8/4/2025 Paige Leopold CNP     Lamotrigine Level 4/21/25 = 12.6

## 2025-05-22 ENCOUNTER — HOSPITAL ENCOUNTER (OUTPATIENT)
Dept: WOMENS IMAGING | Age: 45
Discharge: HOME | End: 2025-05-22
Payer: COMMERCIAL

## 2025-05-22 DIAGNOSIS — Z12.31 VISIT FOR SCREENING MAMMOGRAM: ICD-10-CM

## 2025-05-22 PROCEDURE — 77063 BREAST TOMOSYNTHESIS BI: CPT

## 2025-06-13 DIAGNOSIS — R56.9 SEIZURE-LIKE ACTIVITY (HCC): ICD-10-CM

## 2025-06-13 DIAGNOSIS — I95.1 SYNCOPE DUE TO ORTHOSTATIC HYPOTENSION: ICD-10-CM

## 2025-06-13 RX ORDER — LAMOTRIGINE 100 MG/1
TABLET ORAL
Qty: 180 TABLET | Refills: 2 | Status: SHIPPED | OUTPATIENT
Start: 2025-06-13

## 2025-06-13 NOTE — TELEPHONE ENCOUNTER
We have received a refill request on the following medications:  Requested Prescriptions     Pending Prescriptions Disp Refills    lamoTRIgine (LAMICTAL) 100 MG tablet [Pharmacy Med Name: lamoTRIgine 100 MG Oral Tablet] 180 tablet 2     Sig: TAKE 3 TABLETS BY MOUTH TWICE DAILY       Date last time medication prescribed: 5/14/2025    Last Visit Date:  8/2/2024 with Vikas Tidwell MD    Next Visit Date:    8/4/2025 with Paige Leopold, CNP    Please approve or deny.